# Patient Record
Sex: FEMALE | Race: WHITE | Employment: FULL TIME
[De-identification: names, ages, dates, MRNs, and addresses within clinical notes are randomized per-mention and may not be internally consistent; named-entity substitution may affect disease eponyms.]

---

## 2017-03-01 ENCOUNTER — OFFICE VISIT (OUTPATIENT)
Dept: OBGYN | Facility: CLINIC | Age: 22
End: 2017-03-01

## 2017-03-01 VITALS
SYSTOLIC BLOOD PRESSURE: 126 MMHG | DIASTOLIC BLOOD PRESSURE: 62 MMHG | BODY MASS INDEX: 20.06 KG/M2 | HEIGHT: 62 IN | WEIGHT: 109 LBS

## 2017-03-01 DIAGNOSIS — Z30.46 IMPLANON REMOVAL: Primary | ICD-10-CM

## 2017-03-01 DIAGNOSIS — N94.6 DYSMENORRHEA: ICD-10-CM

## 2017-03-01 PROCEDURE — 11982 REMOVE DRUG IMPLANT DEVICE: CPT | Performed by: OBSTETRICS & GYNECOLOGY

## 2017-03-01 RX ORDER — MEDROXYPROGESTERONE ACETATE 150 MG/ML
150 INJECTION, SUSPENSION INTRAMUSCULAR ONCE
Status: COMPLETED | OUTPATIENT
Start: 2017-03-01 | End: 2017-03-01

## 2017-03-01 RX ADMIN — MEDROXYPROGESTERONE ACETATE 150 MG: 150 INJECTION, SUSPENSION INTRAMUSCULAR at 17:29

## 2017-03-01 ASSESSMENT — PATIENT HEALTH QUESTIONNAIRE - PHQ9
SUM OF ALL RESPONSES TO PHQ9 QUESTIONS 1 & 2: 2
1. LITTLE INTEREST OR PLEASURE IN DOING THINGS: 1
SUM OF ALL RESPONSES TO PHQ QUESTIONS 1-9: 2
2. FEELING DOWN, DEPRESSED OR HOPELESS: 1

## 2017-09-05 ENCOUNTER — OFFICE VISIT (OUTPATIENT)
Dept: INTERNAL MEDICINE CLINIC | Age: 22
End: 2017-09-05
Payer: COMMERCIAL

## 2017-09-05 VITALS
TEMPERATURE: 98.7 F | HEIGHT: 62 IN | DIASTOLIC BLOOD PRESSURE: 78 MMHG | BODY MASS INDEX: 20.28 KG/M2 | SYSTOLIC BLOOD PRESSURE: 110 MMHG | WEIGHT: 110.2 LBS

## 2017-09-05 DIAGNOSIS — F41.9 ANXIETY AND DEPRESSION: ICD-10-CM

## 2017-09-05 DIAGNOSIS — F20.89 OTHER SCHIZOPHRENIA (HCC): ICD-10-CM

## 2017-09-05 DIAGNOSIS — F12.90 MARIJUANA USE, EPISODIC: ICD-10-CM

## 2017-09-05 DIAGNOSIS — Z83.3 FAMILY HISTORY OF DIABETES MELLITUS (DM): ICD-10-CM

## 2017-09-05 DIAGNOSIS — F32.A ANXIETY AND DEPRESSION: ICD-10-CM

## 2017-09-05 DIAGNOSIS — Z71.6 TOBACCO ABUSE COUNSELING: ICD-10-CM

## 2017-09-05 DIAGNOSIS — Z72.0 TOBACCO ABUSE: ICD-10-CM

## 2017-09-05 DIAGNOSIS — R53.83 FATIGUE, UNSPECIFIED TYPE: Primary | ICD-10-CM

## 2017-09-05 PROBLEM — F20.9 SCHIZOPHRENIA (HCC): Status: ACTIVE | Noted: 2017-09-05

## 2017-09-05 PROCEDURE — 99214 OFFICE O/P EST MOD 30 MIN: CPT | Performed by: FAMILY MEDICINE

## 2017-09-05 ASSESSMENT — ENCOUNTER SYMPTOMS
GASTROINTESTINAL NEGATIVE: 1
ALLERGIC/IMMUNOLOGIC NEGATIVE: 1
EYES NEGATIVE: 1
RESPIRATORY NEGATIVE: 1

## 2017-09-07 ENCOUNTER — HOSPITAL ENCOUNTER (OUTPATIENT)
Age: 22
Setting detail: SPECIMEN
Discharge: HOME | End: 2017-09-07
Payer: COMMERCIAL

## 2017-09-07 DIAGNOSIS — F20.89 OTHER SCHIZOPHRENIA (HCC): ICD-10-CM

## 2017-09-07 DIAGNOSIS — R53.83 FATIGUE, UNSPECIFIED TYPE: ICD-10-CM

## 2017-09-07 DIAGNOSIS — F41.9 ANXIETY AND DEPRESSION: ICD-10-CM

## 2017-09-07 DIAGNOSIS — F32.A ANXIETY AND DEPRESSION: ICD-10-CM

## 2017-09-07 LAB
ALBUMIN SERPL-MCNC: 4.3 G/DL (ref 3.5–5.2)
ALBUMIN/GLOBULIN RATIO: 1.5 (ref 1–2.5)
ALP BLD-CCNC: 68 U/L (ref 35–104)
ALT SERPL-CCNC: 26 U/L (ref 5–33)
ANION GAP SERPL CALCULATED.3IONS-SCNC: 15 MMOL/L (ref 9–17)
AST SERPL-CCNC: 21 U/L
BILIRUB SERPL-MCNC: 0.27 MG/DL (ref 0.3–1.2)
BUN BLDV-MCNC: 6 MG/DL (ref 6–20)
BUN/CREAT BLD: ABNORMAL (ref 9–20)
CALCIUM SERPL-MCNC: 9.3 MG/DL (ref 8.6–10.4)
CHLORIDE BLD-SCNC: 103 MMOL/L (ref 98–107)
CHOLESTEROL/HDL RATIO: 3.3
CHOLESTEROL: 150 MG/DL
CO2: 21 MMOL/L (ref 20–31)
CREAT SERPL-MCNC: 0.53 MG/DL (ref 0.5–0.9)
CREATININE URINE: 109.1 MG/DL (ref 28–217)
GFR AFRICAN AMERICAN: >60 ML/MIN
GFR NON-AFRICAN AMERICAN: >60 ML/MIN
GFR SERPL CREATININE-BSD FRML MDRD: ABNORMAL ML/MIN/{1.73_M2}
GFR SERPL CREATININE-BSD FRML MDRD: ABNORMAL ML/MIN/{1.73_M2}
GLUCOSE BLD-MCNC: 78 MG/DL (ref 70–99)
HAV IGM SER IA-ACNC: NONREACTIVE
HCT VFR BLD CALC: 43.1 % (ref 36–46)
HDLC SERPL-MCNC: 46 MG/DL
HEMOGLOBIN: 14.5 G/DL (ref 12–16)
HEPATITIS B CORE IGM ANTIBODY: NONREACTIVE
HEPATITIS B SURFACE ANTIGEN: NONREACTIVE
HEPATITIS C ANTIBODY: NONREACTIVE
HIV AG/AB: NONREACTIVE
LDL CHOLESTEROL: 92 MG/DL (ref 0–130)
MCH RBC QN AUTO: 32.9 PG (ref 26–34)
MCHC RBC AUTO-ENTMCNC: 33.6 G/DL (ref 31–37)
MCV RBC AUTO: 98.1 FL (ref 80–100)
MICROALBUMIN/CREAT 24H UR: <12 MG/L
MICROALBUMIN/CREAT UR-RTO: 11 MCG/MG CREAT
PDW BLD-RTO: 12.9 % (ref 12.5–15.4)
PLATELET # BLD: 296 K/UL (ref 140–450)
PMV BLD AUTO: 9.5 FL (ref 6–12)
POTASSIUM SERPL-SCNC: 3.9 MMOL/L (ref 3.7–5.3)
RBC # BLD: 4.39 M/UL (ref 4–5.2)
SODIUM BLD-SCNC: 139 MMOL/L (ref 135–144)
T. PALLIDUM, IGG: NONREACTIVE
TOTAL PROTEIN: 7.2 G/DL (ref 6.4–8.3)
TRIGL SERPL-MCNC: 59 MG/DL
TSH SERPL DL<=0.05 MIU/L-ACNC: 1.33 MIU/L (ref 0.3–5)
VITAMIN D 25-HYDROXY: 20.8 NG/ML (ref 30–100)
VLDLC SERPL CALC-MCNC: NORMAL MG/DL (ref 1–30)
WBC # BLD: 7.1 K/UL (ref 3.5–11)

## 2017-09-08 LAB
ESTIMATED AVERAGE GLUCOSE: 85 MG/DL
HBA1C MFR BLD: 4.6 % (ref 4–6)

## 2017-09-12 LAB — VDRL, QUANTITATIVE: NEGATIVE

## 2017-10-03 ENCOUNTER — OFFICE VISIT (OUTPATIENT)
Dept: INTERNAL MEDICINE CLINIC | Age: 22
End: 2017-10-03
Payer: COMMERCIAL

## 2017-10-03 VITALS
BODY MASS INDEX: 20.02 KG/M2 | SYSTOLIC BLOOD PRESSURE: 104 MMHG | WEIGHT: 108.8 LBS | HEART RATE: 75 BPM | RESPIRATION RATE: 17 BRPM | HEIGHT: 62 IN | DIASTOLIC BLOOD PRESSURE: 60 MMHG | TEMPERATURE: 98 F | OXYGEN SATURATION: 98 %

## 2017-10-03 DIAGNOSIS — F32.A ANXIETY AND DEPRESSION: ICD-10-CM

## 2017-10-03 DIAGNOSIS — F12.90 MARIJUANA USE, EPISODIC: ICD-10-CM

## 2017-10-03 DIAGNOSIS — F41.9 ANXIETY AND DEPRESSION: ICD-10-CM

## 2017-10-03 DIAGNOSIS — R79.89 LOW VITAMIN D LEVEL: ICD-10-CM

## 2017-10-03 DIAGNOSIS — F20.89 OTHER SCHIZOPHRENIA (HCC): Primary | ICD-10-CM

## 2017-10-03 PROBLEM — Z72.0 TOBACCO ABUSE: Status: RESOLVED | Noted: 2017-09-05 | Resolved: 2017-10-03

## 2017-10-03 PROCEDURE — 99213 OFFICE O/P EST LOW 20 MIN: CPT | Performed by: FAMILY MEDICINE

## 2017-10-03 RX ORDER — FLUOXETINE HYDROCHLORIDE 20 MG/1
40 CAPSULE ORAL DAILY
COMMUNITY

## 2017-10-03 RX ORDER — OMEGA-3S/DHA/EPA/FISH OIL/D3 300MG-1000
400 CAPSULE ORAL DAILY
Qty: 30 TABLET | Refills: 3 | Status: SHIPPED | OUTPATIENT
Start: 2017-10-03

## 2017-10-03 RX ORDER — BUPROPION HYDROCHLORIDE 150 MG/1
150 TABLET ORAL EVERY MORNING
COMMUNITY

## 2017-10-03 ASSESSMENT — ENCOUNTER SYMPTOMS
RESPIRATORY NEGATIVE: 1
EYES NEGATIVE: 1
GASTROINTESTINAL NEGATIVE: 1
ALLERGIC/IMMUNOLOGIC NEGATIVE: 1

## 2017-10-03 NOTE — PROGRESS NOTES
Subjective:      Patient ID: J Luis Coles is a 20 y.o. female. Mental Health Problem   The primary symptoms include dysphoric mood. The current episode started more than 1 month ago. This is a chronic problem. The onset of the illness is precipitated by emotional stress. The degree of incapacity that she is experiencing as a consequence of her illness is moderate. Additional symptoms of the illness include anhedonia and distractible. She does not admit to suicidal ideas. She does not have a plan to commit suicide. She does not contemplate harming herself. She has not already injured self. She does not contemplate injuring another person. She has not already  injured another person. Risk factors that are present for mental illness include a history of mental illness. Review of Systems   Constitutional: Negative. HENT: Negative. Eyes: Negative. Respiratory: Negative. Cardiovascular: Negative. Gastrointestinal: Negative. Endocrine: Negative. Genitourinary: Negative. Musculoskeletal: Negative. Skin: Negative. Allergic/Immunologic: Negative. Neurological: Negative. Hematological: Negative. Psychiatric/Behavioral: Positive for dysphoric mood. History of schizophrenia   Past family and social history unremarkable. Objective:   Physical Exam   Constitutional: She is oriented to person, place, and time. She appears well-developed and well-nourished. HENT:   Head: Normocephalic and atraumatic. Right Ear: External ear normal.   Left Ear: External ear normal.   Nose: Nose normal.   Mouth/Throat: Oropharynx is clear and moist.   Eyes: Conjunctivae and EOM are normal. Pupils are equal, round, and reactive to light. Neck: Normal range of motion. Neck supple. Cardiovascular: Normal rate, regular rhythm, normal heart sounds and intact distal pulses. Pulmonary/Chest: Effort normal and breath sounds normal.   Abdominal: Soft.  Bowel sounds are normal.

## 2017-10-03 NOTE — MR AVS SNAPSHOT
Quantity:  30 tablet   Refills:  3   Started by:  Kelsie Britt MD            Where to Get Your Medications      These medications were sent to [facility] [address] [facility], [address] [phone]  [address] [facility], [address] [place] [id number]     Phone:  [phone]     vitamin D3 400 units Tabs tablet               Your Current Medications Are              buPROPion (WELLBUTRIN XL) 150 MG extended release tablet Take 150 mg by mouth every morning    FLUoxetine (PROZAC) 20 MG capsule Take 40 mg by mouth daily    vitamin D3 (CHOLECALCIFEROL) 400 units TABS tablet Take 1 tablet by mouth daily    fluticasone (VERAMYST) 27.5 MCG/SPRAY nasal spray 2 sprays by Nasal route daily      Allergies              Latex     Haloperidol Shortness Of Breath    Haloperidol Lactate Shortness Of Breath    Sulfa Antibiotics          Additional Information        Basic Information     Date Of Birth Sex Race Ethnicity Preferred Language    1995 Female White Non-/Non  English      Problem List as of 10/3/2017  Date Reviewed: 10/3/2017                Low vitamin D level    Schizophrenia ([facility] [address]    Anxiety and depression    Gestational diabetes mellitus (GDM), delivered    Marijuana use, episodic      Preventive Care        Date Due    Yearly Flu Vaccine (1) 3/16/2018 (Originally 9/1/2017)    Pneumococcal Vaccine - Pneumovax for adults aged 19-64 years with: chronic heart disease, chronic lung disease, diabetes mellitus, alcoholism, chronic liver disease, or cigarette smoking. (1 of 1 - PPSV23) 3/16/2018 (Originally 3/6/2013)    Tetanus Combination Vaccine (1 - Tdap) 4/13/2018 (Originally 3/6/2013)    Pap Smear 11/14/2018 (Originally 3/6/2015)            [address] [place] [address] records indicate that you have an active [employer] account. You can view your After Visit Summary by going to [email or web address] and logging in with your [employer] username and password. If you don't have a [employer] username and password but a parent or guardian has access to your record, the parent or guardian should login with their own [employer] username and password and access your record to view the After Visit Summary. Additional Information  If you have questions, please contact the physician practice where you receive care. Remember, [employer] is NOT to be used for urgent needs. For medical emergencies, dial 911. For questions regarding your [employer] account call [phone]. If you have a clinical question, please call your doctor's office.

## 2017-10-18 ENCOUNTER — HOSPITAL ENCOUNTER (EMERGENCY)
Age: 22
Discharge: HOME | End: 2017-10-18
Attending: EMERGENCY MEDICINE
Payer: COMMERCIAL

## 2017-10-18 VITALS
HEART RATE: 108 BPM | DIASTOLIC BLOOD PRESSURE: 61 MMHG | BODY MASS INDEX: 19.88 KG/M2 | RESPIRATION RATE: 16 BRPM | HEIGHT: 62 IN | WEIGHT: 108 LBS | OXYGEN SATURATION: 98 % | SYSTOLIC BLOOD PRESSURE: 116 MMHG | TEMPERATURE: 98.5 F

## 2017-10-18 DIAGNOSIS — N39.0 URINARY TRACT INFECTION WITHOUT HEMATURIA, SITE UNSPECIFIED: Primary | ICD-10-CM

## 2017-10-18 LAB
-: ABNORMAL
AMORPHOUS: ABNORMAL
BACTERIA: ABNORMAL
BILIRUBIN URINE: NEGATIVE
CASTS UA: ABNORMAL /LPF
CHP ED QC CHECK: YES
COLOR: YELLOW
COMMENT UA: ABNORMAL
CRYSTALS, UA: ABNORMAL /HPF
EPITHELIAL CELLS UA: ABNORMAL /HPF
GLUCOSE URINE: NEGATIVE
KETONES, URINE: NEGATIVE
LEUKOCYTE ESTERASE, URINE: ABNORMAL
MUCUS: ABNORMAL
NITRITE, URINE: POSITIVE
OTHER OBSERVATIONS UA: ABNORMAL
PH UA: 7 (ref 5–8)
PREGNANCY TEST URINE, POC: NORMAL
PROTEIN UA: ABNORMAL
RBC UA: ABNORMAL /HPF (ref 0–2)
RENAL EPITHELIAL, UA: ABNORMAL /HPF
SPECIFIC GRAVITY UA: 1.01 (ref 1–1.03)
TRICHOMONAS: ABNORMAL
TURBIDITY: ABNORMAL
URINE HGB: ABNORMAL
UROBILINOGEN, URINE: NORMAL
WBC UA: ABNORMAL /HPF (ref 0–5)
YEAST: ABNORMAL

## 2017-10-18 PROCEDURE — 87088 URINE BACTERIA CULTURE: CPT

## 2017-10-18 PROCEDURE — 81001 URINALYSIS AUTO W/SCOPE: CPT

## 2017-10-18 PROCEDURE — 99283 EMERGENCY DEPT VISIT LOW MDM: CPT

## 2017-10-18 PROCEDURE — 84703 CHORIONIC GONADOTROPIN ASSAY: CPT

## 2017-10-18 PROCEDURE — 87186 SC STD MICRODIL/AGAR DIL: CPT

## 2017-10-18 PROCEDURE — 87086 URINE CULTURE/COLONY COUNT: CPT

## 2017-10-18 RX ORDER — TRAZODONE HYDROCHLORIDE 100 MG/1
100 TABLET ORAL NIGHTLY
COMMUNITY

## 2017-10-18 RX ORDER — CIPROFLOXACIN 500 MG/1
500 TABLET, FILM COATED ORAL 2 TIMES DAILY
Qty: 20 TABLET | Refills: 0 | Status: SHIPPED | OUTPATIENT
Start: 2017-10-18 | End: 2017-10-28

## 2017-10-18 RX ORDER — PHENAZOPYRIDINE HYDROCHLORIDE 200 MG/1
200 TABLET, FILM COATED ORAL 3 TIMES DAILY PRN
Qty: 6 TABLET | Refills: 0 | Status: SHIPPED | OUTPATIENT
Start: 2017-10-18 | End: 2017-10-21

## 2017-10-18 ASSESSMENT — PAIN DESCRIPTION - ORIENTATION: ORIENTATION: MID

## 2017-10-18 ASSESSMENT — PAIN DESCRIPTION - FREQUENCY: FREQUENCY: CONTINUOUS

## 2017-10-18 ASSESSMENT — PAIN DESCRIPTION - DESCRIPTORS: DESCRIPTORS: ACHING;CONSTANT;PRESSURE

## 2017-10-18 ASSESSMENT — PAIN SCALES - GENERAL: PAINLEVEL_OUTOF10: 7

## 2017-10-18 ASSESSMENT — PAIN DESCRIPTION - LOCATION: LOCATION: BACK

## 2017-10-18 NOTE — ED NOTES
C/o lower back pain and dysuria for 3 days. Denies fever or discharge. color normal skin warm et dry ambulatory to room.      Jason Canela RN  10/18/17 Irasema Morales

## 2017-10-18 NOTE — ED PROVIDER NOTES
The patient was seen and examined by me in conjunction with the mid-level provider. I agree with his/her assessment and treatment plan. My clinical impression is that the patient has a urinary tract infection.      Jese Bonilla MD  10/18/17 [id number]

## 2017-10-18 NOTE — ED PROVIDER NOTES
[address] [place] [address] ED  eMERGENCY dEPARTMENT eNCOUnter      Pt Name: Venessa Haywood  MRN: [id number]  [place] 1995  Date of evaluation: 10/18/2017  Provider: Patricia Velásquez       Chief Complaint   Patient presents with    Back Pain    Dysuria         HISTORY OF PRESENT ILLNESS  (Location/Symptom, Timing/Onset, Context/Setting, Quality, Duration, Modifying Factors, Severity.)   Venessa Haywood is a 20 y.o. female who presents to the emergency department with Back pain and dysuria for 2 days. Dysuria described as stinging started first.  Symptoms are described as not a constant. Pain in the back is worse with movement and relieved with rest.  Denies any vaginal discharge. No definite alleviating or aggravating factors. No other complaints. Nursing Notes were reviewed. ALLERGIES     Latex;  Haloperidol; Haloperidol lactate; and Sulfa antibiotics    CURRENT MEDICATIONS       Discharge Medication List as of 10/18/2017  6:17 PM      CONTINUE these medications which have NOT CHANGED    Details   traZODone (DESYREL) 100 MG tablet Take 100 mg by mouth nightlyHistorical Med      buPROPion (WELLBUTRIN XL) 150 MG extended release tablet Take 150 mg by mouth every morningHistorical Med      FLUoxetine (PROZAC) 20 MG capsule Take 40 mg by mouth dailyHistorical Med      vitamin D3 (CHOLECALCIFEROL) 400 units TABS tablet Take 1 tablet by mouth daily, Disp-30 tablet, R-3Normal      fluticasone (VERAMYST) 27.5 MCG/SPRAY nasal spray 2 sprays by Nasal route dailyHistorical Med             PAST MEDICAL HISTORY         Diagnosis Date    Anxiety     Depression        SURGICAL HISTORY           Procedure Laterality Date    HAND SURGERY Right 2014    ligament repair    HAND SURGERY Right     ligament repair         FAMILY HISTORY           Problem Relation Age of Onset    High Blood Pressure Mother     Diabetes Mother     Heart Disease Sister     Diabetes Sister      Family Status Relation Status    Mother Alive    Father Alive    Sister Alive        SOCIAL HISTORY      reports that she quit smoking about 3 weeks ago. Her smoking use included Cigarettes. She smoked 1.00 pack per day. She has never used smokeless tobacco. She reports that she drinks about 7.2 oz of alcohol per week . She reports that she does not use drugs. REVIEW OF SYSTEMS    (2-9 systems for level 4, 10 or more for level 5)   Review of Systems    Except as noted above the remainder of the review of systems was reviewed and negative. PHYSICAL EXAM    (up to 7 for level 4, 8 or more for level 5)     ED Triage Vitals [10/18/17 1750]   BP Temp Temp Source Pulse Resp SpO2 Height Weight   116/61 98.5 °F (36.9 °C) Oral 108 16 98 % 5' 2\" (1.575 m) 108 lb (49 kg)     Physical Exam   Constitutional: She is oriented to person, place, and time. She appears well-developed. HENT:   Head: Normocephalic and atraumatic. Neck: Normal range of motion. Neck supple. Cardiovascular: Normal rate and regular rhythm. Pulmonary/Chest: Effort normal and breath sounds normal.   Abdominal: Soft. She exhibits no distension. There is no tenderness. Musculoskeletal: Normal range of motion. Neurological: She is alert and oriented to person, place, and time. Skin: Skin is warm. No rash noted. Psychiatric: She has a normal mood and affect.  Her behavior is normal.               DIAGNOSTIC RESULTS     EKG: All EKG's are interpreted by the Emergency Department Physician who either signs or Co-signs this chart in the absence of a cardiologist.        RADIOLOGY:   Non-plain film images such as CT, Ultrasound and MRI are read by the radiologist. Plain radiographic images are visualized and preliminarily interpreted by the emergency physician with the below findings:        Interpretation per the Radiologist below, if available at the time of this note:          ED BEDSIDE ULTRASOUND:   Performed by ED Physician - none    LABS:  Labs Reviewed   UA W/REFLEX CULTURE - Abnormal; Notable for the following:        Result Value    Turbidity UA CLOUDY (*)     Urine Hgb 2+ (*)     Protein, UA 1+ (*)     Nitrite, Urine POSITIVE (*)     Leukocyte Esterase, Urine LARGE (*)     All other components within normal limits   MICROSCOPIC URINALYSIS - Abnormal; Notable for the following:     Bacteria, UA MANY (*)     Amorphous, UA 2+ (*)     All other components within normal limits   POCT URINE PREGNANCY - Normal   URINE CULTURE CLEAN CATCH   POCT URINALYSIS DIPSTICK       All other labs were within normal range or not returned as of this dictation. EMERGENCY DEPARTMENT COURSE and DIFFERENTIAL DIAGNOSIS/MDM:   Vitals:    Vitals:    10/18/17 1750   BP: 116/61   Pulse: 108   Resp: 16   Temp: 98.5 °F (36.9 °C)   TempSrc: Oral   SpO2: 98%   Weight: 108 lb (49 kg)   Height: 5' 2\" (1.575 m)     Patient is nonseptic and nontoxic appearing. Treated for UTI. No flank pain or CVA tenderness to suggest pulmonary arthritis. Doubt kidney stone. CONSULTS:  None    PROCEDURES:  Procedures        FINAL IMPRESSION      1.  Urinary tract infection without hematuria, site unspecified          DISPOSITION/PLAN   DISPOSITION Decision to Discharge    PATIENT REFERRED TO:   Damari Magallanes MD  [address] [address] [phone]    In 3 days        DISCHARGE MEDICATIONS:     Discharge Medication List as of 10/18/2017  6:17 PM      START taking these medications    Details   ciprofloxacin (CIPRO) 500 MG tablet Take 1 tablet by mouth 2 times daily for 10 days, Disp-20 tablet, R-0Print      phenazopyridine (PYRIDIUM) 200 MG tablet Take 1 tablet by mouth 3 times daily as needed for Pain (bladder spasm/pain), Disp-6 tablet, R-0Print                 (Please note that portions of this note were completed with a voice recognition program.  Efforts were made to edit the dictations but occasionally words are mis-transcribed.)    Jessica Culver

## 2017-10-19 LAB — HCG, PREGNANCY URINE (POC): NEGATIVE

## 2017-10-20 LAB
CULTURE: ABNORMAL
CULTURE: ABNORMAL
Lab: ABNORMAL
ORGANISM: ABNORMAL
SPECIMEN DESCRIPTION: ABNORMAL
SPECIMEN DESCRIPTION: ABNORMAL
STATUS: ABNORMAL

## 2018-01-04 ENCOUNTER — OFFICE VISIT (OUTPATIENT)
Dept: INTERNAL MEDICINE CLINIC | Age: 23
End: 2018-01-04
Payer: COMMERCIAL

## 2018-01-04 VITALS
DIASTOLIC BLOOD PRESSURE: 60 MMHG | BODY MASS INDEX: 19.29 KG/M2 | SYSTOLIC BLOOD PRESSURE: 90 MMHG | HEIGHT: 62 IN | OXYGEN SATURATION: 98 % | HEART RATE: 98 BPM | RESPIRATION RATE: 19 BRPM | WEIGHT: 104.8 LBS

## 2018-01-04 DIAGNOSIS — F32.A ANXIETY AND DEPRESSION: ICD-10-CM

## 2018-01-04 DIAGNOSIS — F41.9 ANXIETY AND DEPRESSION: ICD-10-CM

## 2018-01-04 DIAGNOSIS — R79.89 LOW VITAMIN D LEVEL: ICD-10-CM

## 2018-01-04 DIAGNOSIS — F20.9 SCHIZOPHRENIA, UNSPECIFIED TYPE (HCC): ICD-10-CM

## 2018-01-04 DIAGNOSIS — R53.83 FATIGUE, UNSPECIFIED TYPE: Primary | ICD-10-CM

## 2018-01-04 DIAGNOSIS — Z28.21 INFLUENZA VACCINATION DECLINED: ICD-10-CM

## 2018-01-04 PROCEDURE — G8484 FLU IMMUNIZE NO ADMIN: HCPCS | Performed by: FAMILY MEDICINE

## 2018-01-04 PROCEDURE — 1036F TOBACCO NON-USER: CPT | Performed by: FAMILY MEDICINE

## 2018-01-04 PROCEDURE — G8420 CALC BMI NORM PARAMETERS: HCPCS | Performed by: FAMILY MEDICINE

## 2018-01-04 PROCEDURE — 99213 OFFICE O/P EST LOW 20 MIN: CPT | Performed by: FAMILY MEDICINE

## 2018-01-04 PROCEDURE — G8427 DOCREV CUR MEDS BY ELIG CLIN: HCPCS | Performed by: FAMILY MEDICINE

## 2018-01-04 RX ORDER — PROPRANOLOL HYDROCHLORIDE 10 MG/1
10 TABLET ORAL 3 TIMES DAILY
COMMUNITY

## 2018-01-05 ASSESSMENT — ENCOUNTER SYMPTOMS
EYES NEGATIVE: 1
RESPIRATORY NEGATIVE: 1
GASTROINTESTINAL NEGATIVE: 1
ALLERGIC/IMMUNOLOGIC NEGATIVE: 1

## 2018-01-05 NOTE — PROGRESS NOTES
Subjective:      Patient ID: Otilia Sandhoff is a 20 y.o. female. Fatigue   This is a chronic problem. The current episode started more than 1 month ago. The problem occurs intermittently. The problem has been waxing and waning. Associated symptoms include arthralgias and fatigue. The symptoms are aggravated by stress. She has tried rest, sleep and walking for the symptoms. The treatment provided moderate relief. Review of Systems   Constitutional: Positive for fatigue. HENT: Negative. Eyes: Negative. Respiratory: Negative. Cardiovascular: Negative. Gastrointestinal: Negative. Endocrine: Negative. Musculoskeletal: Positive for arthralgias. Skin: Negative. Allergic/Immunologic: Negative. Neurological: Negative. Hematological: Negative. Psychiatric/Behavioral: Positive for dysphoric mood. The patient is nervous/anxious. Past family and social history unremarkable. Objective:   Physical Exam   Constitutional: She is oriented to person, place, and time. She appears well-developed and well-nourished. HENT:   Head: Normocephalic and atraumatic. Right Ear: External ear normal.   Left Ear: External ear normal.   Mouth/Throat: Oropharynx is clear and moist.   Eyes: Conjunctivae and EOM are normal. Pupils are equal, round, and reactive to light. Neck: Normal range of motion. Neck supple. Cardiovascular: Normal rate, regular rhythm, normal heart sounds and intact distal pulses. Pulmonary/Chest: Effort normal and breath sounds normal.   Abdominal: Soft. Bowel sounds are normal.   Genitourinary: Vagina normal and uterus normal.   Musculoskeletal: Normal range of motion. Neurological: She is alert and oriented to person, place, and time. She has normal reflexes. Skin: Skin is warm and dry. Psychiatric:   More disordered-anxiety/depression/schizophrenia. Regular follow-up with mental health services. No recent decompensation   Vitals reviewed.       Assessment:

## 2018-04-12 ENCOUNTER — TELEPHONE (OUTPATIENT)
Dept: INTERNAL MEDICINE CLINIC | Age: 23
End: 2018-04-12

## 2018-07-05 RX ORDER — NITROFURANTOIN 25; 75 MG/1; MG/1
CAPSULE ORAL
Refills: 0 | COMMUNITY
Start: 2018-05-04

## 2019-04-26 ENCOUNTER — OFFICE VISIT (OUTPATIENT)
Dept: INTERNAL MEDICINE CLINIC | Age: 24
End: 2019-04-26
Payer: COMMERCIAL

## 2019-04-26 ENCOUNTER — HOSPITAL ENCOUNTER (OUTPATIENT)
Age: 24
Setting detail: SPECIMEN
Discharge: HOME | End: 2019-04-26
Payer: COMMERCIAL

## 2019-04-26 VITALS
SYSTOLIC BLOOD PRESSURE: 100 MMHG | OXYGEN SATURATION: 98 % | HEIGHT: 62 IN | WEIGHT: 109.2 LBS | BODY MASS INDEX: 20.09 KG/M2 | HEART RATE: 78 BPM | DIASTOLIC BLOOD PRESSURE: 80 MMHG | RESPIRATION RATE: 20 BRPM

## 2019-04-26 DIAGNOSIS — N20.0 RENAL STONE: ICD-10-CM

## 2019-04-26 DIAGNOSIS — R79.89 LOW VITAMIN D LEVEL: ICD-10-CM

## 2019-04-26 DIAGNOSIS — F41.9 ANXIETY AND DEPRESSION: ICD-10-CM

## 2019-04-26 DIAGNOSIS — N39.0 RECURRENT UTI: Primary | ICD-10-CM

## 2019-04-26 DIAGNOSIS — F20.9 SCHIZOPHRENIA, UNSPECIFIED TYPE (HCC): ICD-10-CM

## 2019-04-26 DIAGNOSIS — F12.90 MARIJUANA USE, EPISODIC: ICD-10-CM

## 2019-04-26 DIAGNOSIS — N39.0 RECURRENT UTI: ICD-10-CM

## 2019-04-26 DIAGNOSIS — F32.A ANXIETY AND DEPRESSION: ICD-10-CM

## 2019-04-26 DIAGNOSIS — F17.200 SMOKER: ICD-10-CM

## 2019-04-26 LAB
-: NORMAL
AMORPHOUS: NORMAL
BACTERIA: NORMAL
BILIRUBIN URINE: NEGATIVE
CASTS UA: NORMAL /LPF (ref 0–8)
COLOR: YELLOW
COMMENT UA: ABNORMAL
CRYSTALS, UA: NORMAL /HPF
EPITHELIAL CELLS UA: NORMAL /HPF (ref 0–5)
GLUCOSE URINE: NEGATIVE
KETONES, URINE: NEGATIVE
LEUKOCYTE ESTERASE, URINE: ABNORMAL
MUCUS: NORMAL
NITRITE, URINE: NEGATIVE
OTHER OBSERVATIONS UA: NORMAL
PH UA: 6.5 (ref 5–8)
PROTEIN UA: NEGATIVE
RBC UA: NORMAL /HPF (ref 0–4)
RENAL EPITHELIAL, UA: NORMAL /HPF
SPECIFIC GRAVITY UA: 1.01 (ref 1–1.03)
TRICHOMONAS: NORMAL
TURBIDITY: CLEAR
URINE HGB: NEGATIVE
UROBILINOGEN, URINE: NORMAL
WBC UA: NORMAL /HPF (ref 0–5)
YEAST: NORMAL

## 2019-04-26 PROCEDURE — 1036F TOBACCO NON-USER: CPT | Performed by: FAMILY MEDICINE

## 2019-04-26 PROCEDURE — G8427 DOCREV CUR MEDS BY ELIG CLIN: HCPCS | Performed by: FAMILY MEDICINE

## 2019-04-26 PROCEDURE — 99214 OFFICE O/P EST MOD 30 MIN: CPT | Performed by: FAMILY MEDICINE

## 2019-04-26 PROCEDURE — G8420 CALC BMI NORM PARAMETERS: HCPCS | Performed by: FAMILY MEDICINE

## 2019-04-26 RX ORDER — CEPHALEXIN 250 MG/1
250 CAPSULE ORAL 3 TIMES DAILY
Qty: 30 CAPSULE | Refills: 0 | Status: SHIPPED | OUTPATIENT
Start: 2019-04-26 | End: 2019-05-06

## 2019-04-26 ASSESSMENT — PATIENT HEALTH QUESTIONNAIRE - PHQ9
2. FEELING DOWN, DEPRESSED OR HOPELESS: 0
1. LITTLE INTEREST OR PLEASURE IN DOING THINGS: 0
SUM OF ALL RESPONSES TO PHQ9 QUESTIONS 1 & 2: 0
SUM OF ALL RESPONSES TO PHQ QUESTIONS 1-9: 0
SUM OF ALL RESPONSES TO PHQ QUESTIONS 1-9: 0

## 2019-04-26 ASSESSMENT — ENCOUNTER SYMPTOMS
GASTROINTESTINAL NEGATIVE: 1
BACK PAIN: 1
RESPIRATORY NEGATIVE: 1
ALLERGIC/IMMUNOLOGIC NEGATIVE: 1
EYES NEGATIVE: 1

## 2019-04-26 NOTE — PROGRESS NOTES
Subjective:      Patient ID: Daron Santiago is a 21 y.o. female. Urinary Tract Infection    This is a recurrent problem. The current episode started more than 1 month ago. The problem occurs intermittently. The problem has been waxing and waning. The quality of the pain is described as aching. The pain is mild. There has been no fever. There is a history of pyelonephritis. Associated symptoms include flank pain. She has tried increased fluids for the symptoms. The treatment provided moderate relief. Her past medical history is significant for kidney stones and recurrent UTIs. Review of Systems   Constitutional: Negative. HENT: Negative. Eyes: Negative. Respiratory: Negative. Cardiovascular: Negative. Gastrointestinal: Negative. Endocrine: Negative. Genitourinary: Positive for flank pain. Musculoskeletal: Positive for arthralgias and back pain. Skin: Negative. Allergic/Immunologic: Negative. Neurological: Negative. Hematological: Negative. Psychiatric/Behavioral: Positive for behavioral problems, decreased concentration, dysphoric mood and sleep disturbance. The patient is nervous/anxious. Past family and social history unremarkable. Objective:   Physical Exam   Constitutional: She is oriented to person, place, and time. She appears well-developed and well-nourished. HENT:   Head: Normocephalic and atraumatic. Right Ear: External ear normal.   Left Ear: External ear normal.   Mouth/Throat: Oropharynx is clear and moist.   Eyes: Pupils are equal, round, and reactive to light. Conjunctivae and EOM are normal.   Neck: Normal range of motion. Neck supple. Cardiovascular: Normal rate, regular rhythm, normal heart sounds and intact distal pulses. Pulmonary/Chest: Effort normal and breath sounds normal.   Abdominal: Soft. Bowel sounds are normal.   Genitourinary: Vagina normal and uterus normal.   Musculoskeletal: Normal range of motion.    Neurological: She is alert and oriented to person, place, and time. She has normal reflexes. Skin: Skin is warm and dry. Psychiatric:   History of schizophrenia, anxiety and depression. Improved well. .  She is noncompliant with mental health follow-up   Nursing note and vitals reviewed. Assessment:       Diagnosis Orders   1. Recurrent UTI  UA without Microscopic, Reflex C&S    Darlene Arellano MD, Urology, [place]   2. Renal stone  UA without Microscopic, Reflex C&S    Darlene Arellano MD, Urology, [place]   3. Schizophrenia, unspecified type ([facility] [address]     4. Anxiety and depression     5. Gestational diabetes mellitus (GDM), delivered     6. Marijuana use, episodic     7. Low vitamin D level     8. Smoker             Plan:      50-year-old  female is presented with recurrent UTI. Overall she's been compensated, afebrile hemodynamically stable. Clinical examination is not reproducible. She is advised to maintain oral hydration. She is been placed on Keflex. Patient was seen in the emergency room at [facility] in January 2019. She had a CT scan of the abdomen and pelvis that showed 4 mm stone in the right kidney. In view of history of recurrent UTI within the stool, we will schedule an appointment with urology. Underlying history of schizophrenia, anxiety and depression. Patient stated that she stopped going to mental at services because she is feeling better on Wellbutrin she denies suicidality. History of gestational diabetes. She is euglycemic  Marijuana use  Smoking. Once again she is counseled, quit date, alternatives to consider.   She denies excessive alcohol, cocaine or crack use  She is advised to update tetanus pelvic exam with her GYN  Recommendations to follow labs  She may go to the emergency room as necessary  This note is created with a voice recognition program and while intend to generate a document that accurately reflects the content of the visit, no guarantee can be provided that every mistake has been identified and corrected by editing.                 Anna Ornelas MD

## 2019-04-27 LAB
CULTURE: NORMAL
Lab: NORMAL
SPECIMEN DESCRIPTION: NORMAL

## 2022-09-17 ENCOUNTER — APPOINTMENT (OUTPATIENT)
Dept: CT IMAGING | Age: 27
End: 2022-09-17

## 2022-09-17 ENCOUNTER — HOSPITAL ENCOUNTER (EMERGENCY)
Age: 27
Discharge: HOME | End: 2022-09-17
Attending: STUDENT IN AN ORGANIZED HEALTH CARE EDUCATION/TRAINING PROGRAM

## 2022-09-17 VITALS
SYSTOLIC BLOOD PRESSURE: 104 MMHG | RESPIRATION RATE: 16 BRPM | BODY MASS INDEX: 23.92 KG/M2 | DIASTOLIC BLOOD PRESSURE: 68 MMHG | HEART RATE: 65 BPM | TEMPERATURE: 97.8 F | WEIGHT: 130 LBS | OXYGEN SATURATION: 98 % | HEIGHT: 62 IN

## 2022-09-17 DIAGNOSIS — K62.5 RECTAL BLEEDING: Primary | ICD-10-CM

## 2022-09-17 LAB
ABSOLUTE EOS #: 0 K/UL (ref 0–0.4)
ABSOLUTE LYMPH #: 0.8 K/UL (ref 1–4.8)
ABSOLUTE MONO #: 0.6 K/UL (ref 0.1–1.3)
ALBUMIN SERPL-MCNC: 4.3 G/DL (ref 3.5–5.2)
ALP BLD-CCNC: 69 U/L (ref 35–104)
ALT SERPL-CCNC: 8 U/L (ref 5–33)
ANION GAP SERPL CALCULATED.3IONS-SCNC: 13 MMOL/L (ref 9–17)
AST SERPL-CCNC: 12 U/L
BASOPHILS # BLD: 0 % (ref 0–2)
BASOPHILS ABSOLUTE: 0 K/UL (ref 0–0.2)
BILIRUB SERPL-MCNC: 0.3 MG/DL (ref 0.3–1.2)
BUN BLDV-MCNC: 9 MG/DL (ref 6–20)
CALCIUM SERPL-MCNC: 9 MG/DL (ref 8.6–10.4)
CHLORIDE BLD-SCNC: 104 MMOL/L (ref 98–107)
CO2: 19 MMOL/L (ref 20–31)
CREAT SERPL-MCNC: 0.5 MG/DL (ref 0.5–0.9)
DATE, STOOL #1: ABNORMAL
EOSINOPHILS RELATIVE PERCENT: 0 % (ref 0–4)
GFR AFRICAN AMERICAN: >60 ML/MIN
GFR NON-AFRICAN AMERICAN: >60 ML/MIN
GFR SERPL CREATININE-BSD FRML MDRD: ABNORMAL ML/MIN/{1.73_M2}
GLUCOSE BLD-MCNC: 93 MG/DL (ref 70–99)
HCG QUALITATIVE: NEGATIVE
HCT VFR BLD CALC: 36.6 % (ref 36–46)
HEMOCCULT SP1 STL QL: POSITIVE
HEMOGLOBIN: 12.5 G/DL (ref 12–16)
INR BLD: 1
LYMPHOCYTES # BLD: 7 % (ref 24–44)
MCH RBC QN AUTO: 32.5 PG (ref 26–34)
MCHC RBC AUTO-ENTMCNC: 34 G/DL (ref 31–37)
MCV RBC AUTO: 95.7 FL (ref 80–100)
MONOCYTES # BLD: 5 % (ref 1–7)
PDW BLD-RTO: 12.1 % (ref 11.5–14.9)
PLATELET # BLD: 386 K/UL (ref 150–450)
PMV BLD AUTO: 7.7 FL (ref 6–12)
POTASSIUM SERPL-SCNC: 3.9 MMOL/L (ref 3.7–5.3)
PROTHROMBIN TIME: 13.4 SEC (ref 11.8–14.6)
RBC # BLD: 3.83 M/UL (ref 4–5.2)
SEG NEUTROPHILS: 88 % (ref 36–66)
SEGMENTED NEUTROPHILS ABSOLUTE COUNT: 11.1 K/UL (ref 1.3–9.1)
SODIUM BLD-SCNC: 136 MMOL/L (ref 135–144)
TIME, STOOL #1: 408
TOTAL PROTEIN: 7.6 G/DL (ref 6.4–8.3)
WBC # BLD: 12.5 K/UL (ref 3.5–11)

## 2022-09-17 PROCEDURE — 36415 COLL VENOUS BLD VENIPUNCTURE: CPT

## 2022-09-17 PROCEDURE — 99285 EMERGENCY DEPT VISIT HI MDM: CPT

## 2022-09-17 PROCEDURE — 80053 COMPREHEN METABOLIC PANEL: CPT

## 2022-09-17 PROCEDURE — 85025 COMPLETE CBC W/AUTO DIFF WBC: CPT

## 2022-09-17 PROCEDURE — 2580000003 HC RX 258: Performed by: STUDENT IN AN ORGANIZED HEALTH CARE EDUCATION/TRAINING PROGRAM

## 2022-09-17 PROCEDURE — 84703 CHORIONIC GONADOTROPIN ASSAY: CPT

## 2022-09-17 PROCEDURE — 74177 CT ABD & PELVIS W/CONTRAST: CPT

## 2022-09-17 PROCEDURE — 85610 PROTHROMBIN TIME: CPT

## 2022-09-17 PROCEDURE — 6360000004 HC RX CONTRAST MEDICATION: Performed by: STUDENT IN AN ORGANIZED HEALTH CARE EDUCATION/TRAINING PROGRAM

## 2022-09-17 PROCEDURE — 82270 OCCULT BLOOD FECES: CPT

## 2022-09-17 RX ORDER — SODIUM CHLORIDE 0.9 % (FLUSH) 0.9 %
10 SYRINGE (ML) INJECTION PRN
Status: COMPLETED | OUTPATIENT
Start: 2022-09-17 | End: 2022-09-17

## 2022-09-17 RX ORDER — 0.9 % SODIUM CHLORIDE 0.9 %
100 INTRAVENOUS SOLUTION INTRAVENOUS ONCE
Status: COMPLETED | OUTPATIENT
Start: 2022-09-17 | End: 2022-09-17

## 2022-09-17 RX ADMIN — SODIUM CHLORIDE, PRESERVATIVE FREE 10 ML: 5 INJECTION INTRAVENOUS at 05:30

## 2022-09-17 RX ADMIN — SODIUM CHLORIDE 100 ML: 9 INJECTION, SOLUTION INTRAVENOUS at 05:30

## 2022-09-17 RX ADMIN — IOPAMIDOL 75 ML: 755 INJECTION, SOLUTION INTRAVENOUS at 05:30

## 2022-09-17 ASSESSMENT — ENCOUNTER SYMPTOMS
VOMITING: 0
SORE THROAT: 0
ABDOMINAL PAIN: 0
EYE REDNESS: 0
EYE DISCHARGE: 0
NAUSEA: 0
BLOOD IN STOOL: 1
RHINORRHEA: 0
SHORTNESS OF BREATH: 0
DIARRHEA: 0

## 2022-09-17 ASSESSMENT — PAIN - FUNCTIONAL ASSESSMENT: PAIN_FUNCTIONAL_ASSESSMENT: NONE - DENIES PAIN

## 2022-09-17 NOTE — ED PROVIDER NOTES
EMERGENCY DEPARTMENT ENCOUNTER    Pt Name: Nicolette Gonsalez  MRN: [id number]  [place] 1995  Date of evaluation: 9/17/22  CHIEF COMPLAINT       Chief Complaint   Patient presents with    Rectal Bleeding     For one month, worsening over the last four days. HISTORY OF PRESENT ILLNESS   54-year-old presents with concern for bright red blood per rectum    States its been going on for a while    Blood in her stool    Constant. Feeling a bit lightheaded    No chest pain or pressure. No fevers chills vomiting diarrhea    Went to outside hospital had normal hemoglobin was discharged home she states she feels like she still very concerned and feels like they should have done some sort of imaging            REVIEW OF SYSTEMS     Review of Systems   Constitutional:  Negative for chills and fever. HENT:  Negative for rhinorrhea and sore throat. Eyes:  Negative for discharge and redness. Respiratory:  Negative for shortness of breath. Cardiovascular:  Negative for chest pain. Gastrointestinal:  Positive for blood in stool. Negative for abdominal pain, diarrhea, nausea and vomiting. Genitourinary:  Negative for dysuria, frequency and urgency. Musculoskeletal:  Negative for arthralgias and myalgias. Skin:  Negative for rash. Neurological:  Negative for weakness and numbness. Psychiatric/Behavioral:  Negative for suicidal ideas. All other systems reviewed and are negative. PASTMEDICAL HISTORY     Past Medical History:   Diagnosis Date    Anxiety     Depression      Past Problem List  Patient Active Problem List   Diagnosis Code    [facility] F20.9    Anxiety and depression F41.9, F32. A    Gestational diabetes mellitus (GDM), delivered O22.26    Marijuana use, episodic F12.90    Low vitamin D level R79.89    Renal stone N20.0    Smoker F17.200    Recurrent UTI N39.0     SURGICAL HISTORY       Past Surgical History:   Procedure Laterality Date    HAND SURGERY Right 2014    ligament repair HAND SURGERY Right     ligament repair     CURRENT MEDICATIONS       Previous Medications    BUPROPION (WELLBUTRIN XL) 150 MG EXTENDED RELEASE TABLET    Take 150 mg by mouth every morning    FLUOXETINE (PROZAC) 20 MG CAPSULE    Take 40 mg by mouth daily    NITROFURANTOIN, MACROCRYSTAL-MONOHYDRATE, (MACROBID) 100 MG CAPSULE    take 1 capsule by mouth twice a day    PROPRANOLOL (INDERAL) 10 MG TABLET    Take 10 mg by mouth 3 times daily    TRAZODONE (DESYREL) 100 MG TABLET    Take 100 mg by mouth nightly    VITAMIN D3 (CHOLECALCIFEROL) 400 UNITS TABS TABLET    Take 1 tablet by mouth daily     ALLERGIES     is allergic to latex, haloperidol, haloperidol lactate, and sulfa antibiotics. FAMILY HISTORY     She indicated that her mother is alive. She indicated that her father is alive. She indicated that her sister is alive. SOCIAL HISTORY       Social History     Tobacco Use    Smoking status: Former     Packs/day: 1.00     Types: Cigarettes     Quit date: 2017     Years since quittin.9    Smokeless tobacco: Never   Vaping Use    Vaping Use: Every day   Substance Use Topics    Alcohol use: Yes     Alcohol/week: 12.0 standard drinks     Types: 12 Cans of beer per week     Comment: Rare    Drug use: Yes     Types: Marijuana Estil Catena)     Comment: former      PHYSICAL EXAM     INITIAL VITALS: /68   Pulse 65   Temp 97.8 °F (36.6 °C) (Temporal)   Resp 16   Ht 5' 2\" (1.575 m)   Wt 130 lb (59 kg)   SpO2 98%   BMI 23.78 kg/m²    Physical Exam  Vitals and nursing note reviewed. Constitutional:       Appearance: Normal appearance. HENT:      Head: Normocephalic and atraumatic. Nose: Nose normal.      Mouth/Throat:      Mouth: Mucous membranes are moist.   Eyes:      Conjunctiva/sclera: Conjunctivae normal.      Pupils: Pupils are equal, round, and reactive to light. Cardiovascular:      Rate and Rhythm: Normal rate and regular rhythm. Pulses: Normal pulses.       Heart sounds: Normal heart sounds. Pulmonary:      Effort: Pulmonary effort is normal.      Breath sounds: Normal breath sounds. Abdominal:      Palpations: Abdomen is soft. Tenderness: There is no abdominal tenderness. Genitourinary:     Comments: Chaperoned by female nurse. Some blood at gluteal cleft. No obvious other abnormalities  Musculoskeletal:         General: No swelling or deformity. Cervical back: Normal range of motion. Skin:     General: Skin is warm. Findings: No rash. Neurological:      General: No focal deficit present. Mental Status: She is alert and oriented to person, place, and time.    Psychiatric:         Mood and Affect: Mood normal.       MEDICAL DECISION MAKIN yo F w/ rectal bleeding    Labs and CT  ED Course as of 22 0658   Sat Sep 17, 2022   0438 CBC with Auto Differential(!):    WBC 12.5(!)   RBC 3.83(!)   Hemoglobin Quant 12.5   Hematocrit 36.6   MCV 95.7   MCH 32.5   MCHC 34.0   RDW 12.1   Platelet Count 963   MPV 7.7   Seg Neutrophils 88(!)   Lymphocytes 7(!)   Monocytes 5   Eosinophils % 0   Basophils 0   Segs Absolute 11.10(!)   Absolute Lymph # 0.80(!)   Absolute Mono # 0.60   Absolute Eos # 0.00   Basophils Absolute 0.00  No anemia [SMOOTH]   0502 CMP(!):    Glucose, Random 93   BUN,BUNPL 9   Creatinine 0.50   CALCIUM, SERUM, 907756 9.0   Sodium 136   Potassium 3.9   Chloride 104   CO2 19(!)   Anion Gap 13   Alk Phos 69   ALT 8   AST 12   Bilirubin 0.3   Total Protein 7.6   Albumin 4.3   GFR Non- >60   GFR  >60   GFR Comment       Normal [SMOOTH]   0503 Protime-INR:    Prothrombin Time 13.4   INR 1.0  Normal [SMOOTH]   0503 HCG Qualitative, Serum:    hCG Qual NEGATIVE  Negative [SMOOTH]   0503 Occult Blood Screen(!):    Occult Blood, Stool #1 POSITIVE(!)   Date, Stool #1 2,425,992   Time, Stool #1 408  Positive [SMOOTH]   0653 CT ABDOMEN PELVIS W IV CONTRAST Additional Contrast? None  Unremarkable [SMOOTH]      ED Course User Index  [SMOOTH] Lima Obando MD       CRITICAL CARE:       PROCEDURES:    Procedures    DIAGNOSTIC RESULTS   EKG:All EKG's are interpreted by the Emergency Department Physician who either signs or Co-signs this chart in the absence of a cardiologist.        RADIOLOGY:All plain film, CT, MRI, and formal ultrasound images (except ED bedside ultrasound) are read by the radiologist, see reports below, unless otherwisenoted in MDM or here. CT ABDOMEN PELVIS W IV CONTRAST Additional Contrast? None   Final Result   Mild stool volume in the colon, without ileus, obstruction, or inflammatory   bowel wall thickening to suggest enteritis or colitis. Nonobstructive bilateral renal calculi           LABS: All lab results were reviewed by myself, and all abnormals are listed below.   Labs Reviewed   CBC WITH AUTO DIFFERENTIAL - Abnormal; Notable for the following components:       Result Value    WBC 12.5 (*)     RBC 3.83 (*)     Seg Neutrophils 88 (*)     Lymphocytes 7 (*)     Segs Absolute 11.10 (*)     Absolute Lymph # 0.80 (*)     All other components within normal limits   COMPREHENSIVE METABOLIC PANEL - Abnormal; Notable for the following components:    CO2 19 (*)     All other components within normal limits   OCCULT BLOOD SCREEN - Abnormal; Notable for the following components:    Occult Blood, Stool #1 POSITIVE (*)     All other components within normal limits   PROTIME-INR   HCG, SERUM, QUALITATIVE       EMERGENCY DEPARTMENTCOURSE:     This is a 25-year-old woman that presented with some bright red blood per rectum    Here has completely normal hemodynamics    She is relatively young and healthy    Unclear etiology but imaging is unremarkable she has a normal hemoglobin and she is hemodynamically stable    I feel she can have this worked up as an outpatient    I will refer her for GI    She will be given strict return precautions for heavy bleeding or symptoms of anemia    She expressed understanding and agreeable to this plan    Vitals: Vitals:    09/17/22 0238 09/17/22 0632   BP: 121/74 104/68   Pulse: (!) 106 65   Resp: 16 16   Temp: 97.8 °F (36.6 °C)    TempSrc: Temporal    SpO2: 99% 98%   Weight: 130 lb (59 kg)    Height: 5' 2\" (1.575 m)        The patient was given the following medications while in the emergency department:  Orders Placed This Encounter   Medications    0.9 % sodium chloride bolus    iopamidol (ISOVUE-370) 76 % injection 75 mL    sodium chloride flush 0.9 % injection 10 mL     CONSULTS:  None    FINAL IMPRESSION      1. Rectal bleeding          DISPOSITION/PLAN   DISPOSITION Decision To Discharge 09/17/2022 06:55:13 AM      PATIENT REFERRED TO:  [address] [place] [address], MD  [address] [place] [address]  [phone]    Schedule an appointment as soon as possible for a visit       [facility] 469 [phone]    As needed  DISCHARGE MEDICATIONS:  New Prescriptions    No medications on file     The care is provided during an unprecedented national emergency due to the novel coronavirus, COVID 19.   MD Shiraz Burgos MD  09/17/22 0751

## 2022-09-17 NOTE — ED NOTES
Mode of arrival (squad #, walk in, police, etc) : walk in        Chief complaint(s): rectal bleeding        Arrival Note (brief scenario, treatment PTA, etc). : Pt states she has been having bloody stools for one month, increasing over the last few days. Pt was seen at [facility] om 9/16 and was told her hemoglobin was stable and to follow up with GI. Pt would like to know why she is bleeding and feeling like she is dying. C= \"Have you ever felt that you should Cut down on your drinking? \"  No  A= \"Have people Annoyed you by criticizing your drinking? \"  No  G= \"Have you ever felt bad or Guilty about your drinking? \"  No  E= \"Have you ever had a drink as an Eye-opener first thing in the morning to steady your nerves or to help a hangover? \"  No      Deferred []      Reason for deferring: N/A    *If yes to two or more: probable alcohol abuse. Mariano Boyd, ALL  09/17/22 9639

## 2024-03-27 NOTE — ED NOTES
Jolie Morel is a 82 Present in room with Dr. Lopez Kiser during rectal exam.  Patient tolerated without complaint.      Mishel Frederick RN  09/17/22 3024

## 2025-01-20 ENCOUNTER — HOSPITAL ENCOUNTER (INPATIENT)
Age: 30
LOS: 7 days | Discharge: HOME | DRG: 885 | End: 2025-01-27
Attending: PSYCHIATRY & NEUROLOGY | Admitting: PSYCHIATRY & NEUROLOGY
Payer: MEDICAID

## 2025-01-20 PROBLEM — F32.A DEPRESSION WITH SUICIDAL IDEATION: Status: ACTIVE | Noted: 2025-01-20

## 2025-01-20 PROBLEM — R45.851 DEPRESSION WITH SUICIDAL IDEATION: Status: ACTIVE | Noted: 2025-01-20

## 2025-01-20 PROCEDURE — 1240000000 HC EMOTIONAL WELLNESS R&B

## 2025-01-20 RX ORDER — POLYETHYLENE GLYCOL 3350 17 G/17G
17 POWDER, FOR SOLUTION ORAL DAILY PRN
Status: DISCONTINUED | OUTPATIENT
Start: 2025-01-20 | End: 2025-01-27 | Stop reason: HOSPADM

## 2025-01-20 RX ORDER — ACETAMINOPHEN 325 MG/1
650 TABLET ORAL EVERY 4 HOURS PRN
Status: DISCONTINUED | OUTPATIENT
Start: 2025-01-20 | End: 2025-01-27 | Stop reason: HOSPADM

## 2025-01-20 RX ORDER — HYDROXYZINE HYDROCHLORIDE 50 MG/1
50 TABLET, FILM COATED ORAL 3 TIMES DAILY PRN
Status: DISCONTINUED | OUTPATIENT
Start: 2025-01-20 | End: 2025-01-27 | Stop reason: HOSPADM

## 2025-01-20 RX ORDER — POLYETHYLENE GLYCOL 3350 17 G
2 POWDER IN PACKET (EA) ORAL
Status: DISCONTINUED | OUTPATIENT
Start: 2025-01-20 | End: 2025-01-24

## 2025-01-20 RX ORDER — NITROFURANTOIN 25; 75 MG/1; MG/1
100 CAPSULE ORAL EVERY 12 HOURS SCHEDULED
Status: COMPLETED | OUTPATIENT
Start: 2025-01-21 | End: 2025-01-25

## 2025-01-20 RX ORDER — MAGNESIUM HYDROXIDE/ALUMINUM HYDROXICE/SIMETHICONE 120; 1200; 1200 MG/30ML; MG/30ML; MG/30ML
30 SUSPENSION ORAL EVERY 6 HOURS PRN
Status: DISCONTINUED | OUTPATIENT
Start: 2025-01-20 | End: 2025-01-27 | Stop reason: HOSPADM

## 2025-01-20 RX ORDER — IBUPROFEN 400 MG/1
400 TABLET, FILM COATED ORAL EVERY 6 HOURS PRN
Status: DISCONTINUED | OUTPATIENT
Start: 2025-01-20 | End: 2025-01-27 | Stop reason: HOSPADM

## 2025-01-20 ASSESSMENT — PATIENT HEALTH QUESTIONNAIRE - PHQ9
SUM OF ALL RESPONSES TO PHQ QUESTIONS 1-9: 2
SUM OF ALL RESPONSES TO PHQ QUESTIONS 1-9: 2
SUM OF ALL RESPONSES TO PHQ9 QUESTIONS 1 & 2: 2
SUM OF ALL RESPONSES TO PHQ QUESTIONS 1-9: 2
2. FEELING DOWN, DEPRESSED OR HOPELESS: SEVERAL DAYS
1. LITTLE INTEREST OR PLEASURE IN DOING THINGS: SEVERAL DAYS
SUM OF ALL RESPONSES TO PHQ QUESTIONS 1-9: 2

## 2025-01-20 ASSESSMENT — SLEEP AND FATIGUE QUESTIONNAIRES
DO YOU USE A SLEEP AID: NO
SLEEP PATTERN: DIFFICULTY FALLING ASLEEP;DISTURBED/INTERRUPTED SLEEP;RESTLESSNESS
AVERAGE NUMBER OF SLEEP HOURS: 4
DO YOU HAVE DIFFICULTY SLEEPING: YES

## 2025-01-20 ASSESSMENT — LIFESTYLE VARIABLES
HOW MANY STANDARD DRINKS CONTAINING ALCOHOL DO YOU HAVE ON A TYPICAL DAY: PATIENT DOES NOT DRINK
HOW OFTEN DO YOU HAVE A DRINK CONTAINING ALCOHOL: NEVER

## 2025-01-21 PROBLEM — F23 ACUTE PSYCHOSIS (HCC): Status: ACTIVE | Noted: 2025-01-21

## 2025-01-21 PROCEDURE — 6370000000 HC RX 637 (ALT 250 FOR IP): Performed by: NURSE PRACTITIONER

## 2025-01-21 PROCEDURE — 1240000000 HC EMOTIONAL WELLNESS R&B

## 2025-01-21 PROCEDURE — 99222 1ST HOSP IP/OBS MODERATE 55: CPT | Performed by: INTERNAL MEDICINE

## 2025-01-21 PROCEDURE — 90792 PSYCH DIAG EVAL W/MED SRVCS: CPT | Performed by: PSYCHIATRY & NEUROLOGY

## 2025-01-21 PROCEDURE — 6370000000 HC RX 637 (ALT 250 FOR IP): Performed by: PSYCHIATRY & NEUROLOGY

## 2025-01-21 RX ORDER — CHLORPROMAZINE HYDROCHLORIDE 25 MG/1
50 TABLET, FILM COATED ORAL 3 TIMES DAILY PRN
Status: DISCONTINUED | OUTPATIENT
Start: 2025-01-21 | End: 2025-01-27 | Stop reason: HOSPADM

## 2025-01-21 RX ORDER — CHLORPROMAZINE HCI 25 MG/ML
50 INJECTION INTRAMUSCULAR 3 TIMES DAILY PRN
Status: DISCONTINUED | OUTPATIENT
Start: 2025-01-21 | End: 2025-01-27 | Stop reason: HOSPADM

## 2025-01-21 RX ORDER — OLANZAPINE 5 MG/1
5 TABLET ORAL NIGHTLY
Status: DISCONTINUED | OUTPATIENT
Start: 2025-01-21 | End: 2025-01-23

## 2025-01-21 RX ORDER — TESTOSTERONE CYPIONATE 200 MG/ML
50 INJECTION, SOLUTION INTRAMUSCULAR
Status: ON HOLD | COMMUNITY
Start: 2024-11-11 | End: 2025-01-27 | Stop reason: HOSPADM

## 2025-01-21 RX ADMIN — OLANZAPINE 5 MG: 5 TABLET, FILM COATED ORAL at 20:35

## 2025-01-21 RX ADMIN — Medication 3 MG: at 00:14

## 2025-01-21 RX ADMIN — Medication 3 MG: at 20:35

## 2025-01-21 RX ADMIN — NITROFURANTOIN MONOHYDRATE/MACROCRYSTALS 100 MG: 25; 75 CAPSULE ORAL at 09:19

## 2025-01-21 RX ADMIN — NICOTINE POLACRILEX 2 MG: 2 LOZENGE ORAL at 20:39

## 2025-01-21 RX ADMIN — NICOTINE POLACRILEX 2 MG: 2 LOZENGE ORAL at 18:39

## 2025-01-21 RX ADMIN — HYDROXYZINE HYDROCHLORIDE 50 MG: 50 TABLET ORAL at 20:34

## 2025-01-21 RX ADMIN — HYDROXYZINE HYDROCHLORIDE 50 MG: 50 TABLET ORAL at 00:14

## 2025-01-21 RX ADMIN — NITROFURANTOIN MONOHYDRATE/MACROCRYSTALS 100 MG: 25; 75 CAPSULE ORAL at 20:35

## 2025-01-21 ASSESSMENT — LIFESTYLE VARIABLES
HOW OFTEN DO YOU HAVE A DRINK CONTAINING ALCOHOL: NEVER
HOW MANY STANDARD DRINKS CONTAINING ALCOHOL DO YOU HAVE ON A TYPICAL DAY: PATIENT DOES NOT DRINK

## 2025-01-21 NOTE — BH NOTE
Patient given tobacco quitline number 77954635726 ,patient given information as to the dangers of long term tobacco use. Continue to reinforce the importance of tobacco cessation.

## 2025-01-21 NOTE — H&P
[facility] Internal Medicine  Jay Alicea MD; Reagan Contreras MD, Daniel Jiang MD, Kesha Fernandez MD, Vitaliy Sanchez MD; Samantha Perales MD    [facility] Internal Medicine   IN-PATIENT SERVICE   [facility]     HISTORY AND PHYSICAL EXAMINATION            Date:   1/21/2025  Patient name:  Tasneem Martínez  Date of admission:  1/20/2025 11:29 PM  MRN:   [id number]  Account:  [id number]  YOB: 1995  PCP:    No primary care provider on file.  Room:   [address] [place] [address]  Code Status:    Full Code      Chief Complaint:     Suicidal /Ac Psychosis    History Obtained From:     Patient/EMR/bedside RN     History of Present Illness:   29-year-old female with underlying history of anxiety, depression, UTI admitted to inpatient psych with suicidal ideations      Past Medical History:     Past Medical History:   Diagnosis Date    Anxiety     Depression         Past Surgical History:     Past Surgical History:   Procedure Laterality Date    HAND SURGERY Right 2014    ligament repair    HAND SURGERY Right     ligament repair        Medications Prior to Admission:     Prior to Admission medications    Medication Sig Start Date End Date Taking? Authorizing Provider   testosterone cypionate (DEPOTESTOTERONE CYPIONATE) 200 MG/ML injection Inject 0.25 mLs into the skin every 7 days. 11/11/24 2/9/25  Taz Clayton MD   nitrofurantoin, macrocrystal-monohydrate, (MACROBID) 100 MG capsule Take 1 capsule by mouth 2 times daily 1/20/25 1/25/25  Taz Clayton MD        Allergies:     Latex, Haloperidol, Haloperidol lactate, and Sulfa antibiotics    Social History:     Tobacco:    reports that she quit smoking about 7 years ago. Her smoking use included cigarettes. She has never used smokeless tobacco.  Alcohol:      reports current alcohol use of about 12.0 standard drinks of alcohol per week.  Drug Use:  reports current drug use. Drug: Marijuana

## 2025-01-21 NOTE — CARE COORDINATION
[facility] Biopsychosocial Assessment    Current Level of Psychosocial Functioning     Independent   Dependent    Minimal Assist X    Psychosocial High Risk Factors (check all that apply)    Unable to obtain meds   Chronic illness/pain    Substance abuse   Lack of Family Support X  Financial stress X  Isolation X  Inadequate Community Resources  Suicide attempt(s)  Not taking medications X  Victim of crime   Developmental Delay  Unable to manage personal needs  X  Age 65 or older   Homeless  No transportation   Readmission within 30 days  Unemployment  Traumatic Event    Psychiatric Advanced Directives: none reported     Family to Involve in Treatment: lack of family support     Sexual Orientation: PEEWEE     Patient Strengths: adequate housing, motivated for treatment     Patient Barriers: not linked with [facility], not being prescribed any psychiatric medications, reporting auditory hallucinations, presenting on admission with suicidal ideation.     Opiate Education Provided: N/A Patient denies and does not have any documented history of Opiate or Heroin use/abuse. Patient denies any Alcohol or Illicit drug use and drug screen upon admission was negative for all substances.     [facility]/mental health history: Not linked, last linked with [facility], but wants to go somewhere closer to where she is living, lives in an apartment in [place]     Plan of Care   medication management, group/individual therapies, family meetings, psycho -education, treatment team meetings to assist with stabilization    Initial Discharge Plan:  Patient reports a plan to return to apartment in [place] and plans on following up with outpatient treatment at either [facility] or [facility] upon discharge.     Clinical Summary: Patient is a 29 year old female who prefers to be called [facility] and is currently undergoing testosterone replacement therapy. Patient presents on admission with suicidial thoughts to overdose on medications. Patient reports increased auditory

## 2025-01-21 NOTE — GROUP NOTE
Group Therapy Note    Date: 1/21/2025    Group Start Time: 1100  Group End Time: 1150  Group Topic: Cognitive Skills    Kristen Snider CTRS        Group Therapy Note    Attendees: 9/16     Topic: To increase socialization, practice problem solving, deductive reasoning and  communication skills.            Comments:   Patient did not participate in Cognitive Skills Group, at 1100, despite staff encouragement.   Pt was resting in room and seclusive to self when invited to group.      Q15 minute safety checks maintained for patient safety and will continue to encourage   patient to attend unit programming.       Discipline Responsible: Psychoeducational Specialist   Signature: MIRZA ROBERTS

## 2025-01-21 NOTE — GROUP NOTE
Group Therapy Note    Date: 1/21/2025    Group Start Time: 1000  Group End Time: 1030  Group Topic: Psychoeducation    Mohsen Ford        Group Therapy Note    Attendees: 6/16     Patient was offered group therapy today but declined to participate despite encouragement from staff. 1:1 was offered.    Signature:  Mohsen Gaming

## 2025-01-21 NOTE — BH NOTE
[facility]  Initial Interdisciplinary Treatment Plan Note      Original treatment plan Date & Time: 1/21//25 1200    Admission Type:  Admission Type: Involuntary    Reason for admission:   Reason for Admission: Patient presents to the ED with suicidial thoughts to overdose on medications. Patient reports increased auditory hallucinations of multiple different voices having conversations. He also reports increased \"PTSD attacks\" cause inability to sleep    Estimated Length of Stay:  5-7days  Estimated Discharge Date: To be determined by physician.    PATIENT STRENGTHS:  Patient Strengths:   Patient Strengths and Limitations:   Addictive Behavior: Addictive Behavior  In the Past 3 Months, Have You Felt or Has Someone Told You That You Have a Problem With  : None  Medical Problems:  Past Medical History:   Diagnosis Date    Anxiety     Depression      Status EXAM:Mental Status and Behavioral Exam  Normal: No  Level of Assistance: Independent/Self  Facial Expression: Avoids Gaze  Affect: Blunt  Level of Consciousness: Alert  Frequency of Checks: 4 times per hour, close  Mood:Normal: No  Mood: Depressed, Anxious, Irritable, Sad  Motor Activity:Normal: Yes  Eye Contact: Poor  Observed Behavior: Withdrawn, Preoccupied, Guarded  Sexual Misconduct History: Current - no  Preception: [place] to person, [place] to time, [place] to place, [place] to situation  Attention:Normal: No  Attention: Unable to concentrate, Distractible  Thought Processes: Unremarkable  Thought Content:Normal: No  Thought Content: Preoccupations  Depression Symptoms: Feelings of helplessness, Feelings of hopelessess, Feelings of worthlessness, Impaired concentration, Loss of interest, Isolative, Sleep disturbance  Anxiety Symptoms: Panic attack, Generalized, Unexplained fears  Estee Symptoms: No problems reported or observed.  Hallucinations: Auditory (comment)  Delusions: No  Memory:Normal: Yes  Insight and Judgment: No  Insight and

## 2025-01-21 NOTE — BH NOTE
[facility]  Admission Note     Admission Type:   Involuntary - Signed in Upon Admission    Reason for admission:  Reason for Admission: Patient presents to the ED with suicidial thoughts to overdose on medications. Patient reports increased auditory hallucinations of multiple different voices having conversations. He also reports increased \"PTSD attacks\" cause inability to sleep      Addictive Behavior:   Addictive Behavior  In the Past 3 Months, Have You Felt or Has Someone Told You That You Have a Problem With  : None    Medical Problems:   Past Medical History:   Diagnosis Date    Anxiety     Depression        Status EXAM:  Mental Status and Behavioral Exam  Normal: No  Level of Assistance: Independent/Self  Facial Expression: Avoids Gaze  Affect: Blunt  Level of Consciousness: Alert  Frequency of Checks: 4 times per hour, close  Mood:Normal: No  Mood: Depressed, Anxious, Irritable, Sad  Motor Activity:Normal: Yes  Eye Contact: Poor  Observed Behavior: Withdrawn, Preoccupied, Guarded  Sexual Misconduct History: Current - no  Preception: [place] to person, [place] to time, [place] to place, [place] to situation  Attention:Normal: No  Attention: Unable to concentrate, Distractible  Thought Processes: Unremarkable  Thought Content:Normal: No  Thought Content: Preoccupations  Depression Symptoms: Feelings of helplessness, Feelings of hopelessess, Feelings of worthlessness, Impaired concentration, Loss of interest, Isolative, Sleep disturbance  Anxiety Symptoms: Panic attack, Generalized, Unexplained fears  Estee Symptoms: No problems reported or observed.  Hallucinations: Auditory (comment)  Delusions: No  Memory:Normal: Yes  Insight and Judgment: No  Insight and Judgment: Poor judgment, Poor insight    Tobacco Screening:  Practical Counseling, on admission, kevin X, if applicable and completed (first 3 are required if patient doesn't refuse):            ( ) Recognizing danger situations (included

## 2025-01-21 NOTE — H&P
Department of Psychiatry  Attending Physician Psychiatric Assessment   Patient: Tasneem Martínez  MRN: [id number]  Reason for Admission to Psychiatric Unit:  Threat to self requiring 24 hour professional observation  Acute disordered/bizarre behavior or psychomotor agitation or retardation;interferes with ADLs so that patient cannot function at a less intensive care level of care during evaluation and treatment   Concerns about patient's safety in the community  Past Mental Health Diagnosis: a history of  Major Depression, Schizophrenia, and Anxiety Disorder  Triggering event or precipitating factor: Relationship Issues  Length of time/duration of triggering event: Months  Legal Status: Voluntary    CHIEF COMPLAINT:  Auditory Hallucinations with suicidal ideations    History obtained from: Patient, electronic medical record          HISTORY OF PRESENT ILLNESS:    Tasneem Martínez is a 29 y.o. female who prefers to go by Abhinav and he/him pronouns and is currently undergoing testosterone replacement therapy for gender dysphoria.  Has history of ovarian cysts, recurrent UTI and kidney stone.   Patient has a psychiatric history of schizophrenia depression and anxiety.      Patient presented to ER with concerns for auditory hallucinations with commands to harm herself.  Suicidal and homicidal ideations.  Patient was placed in [facility] for management of current condition and for patient safety.    Patient states he continuously has these voices who continuously repeat \"terrible\" things in his head and give him commands to do terrible things to himself and to others around him.  States he has had these voices in his head quite long time.  Is currently not on any antipsychotics was previously on antipsychotics however is unsure of names of medication.  Patient confirms he currently has suicidal ideations with a plan.  Over past 2 weeks the voices have grown louder and become difficult to ignore.  Patient has tried taking multiple

## 2025-01-22 PROCEDURE — 99231 SBSQ HOSP IP/OBS SF/LOW 25: CPT | Performed by: INTERNAL MEDICINE

## 2025-01-22 PROCEDURE — 6370000000 HC RX 637 (ALT 250 FOR IP): Performed by: NURSE PRACTITIONER

## 2025-01-22 PROCEDURE — 1240000000 HC EMOTIONAL WELLNESS R&B

## 2025-01-22 PROCEDURE — 99232 SBSQ HOSP IP/OBS MODERATE 35: CPT | Performed by: PSYCHIATRY & NEUROLOGY

## 2025-01-22 PROCEDURE — 6370000000 HC RX 637 (ALT 250 FOR IP): Performed by: PSYCHIATRY & NEUROLOGY

## 2025-01-22 RX ADMIN — NITROFURANTOIN MONOHYDRATE/MACROCRYSTALS 100 MG: 25; 75 CAPSULE ORAL at 09:54

## 2025-01-22 RX ADMIN — OLANZAPINE 5 MG: 5 TABLET, FILM COATED ORAL at 21:22

## 2025-01-22 RX ADMIN — NICOTINE POLACRILEX 2 MG: 2 LOZENGE ORAL at 18:46

## 2025-01-22 RX ADMIN — NICOTINE POLACRILEX 2 MG: 2 LOZENGE ORAL at 08:43

## 2025-01-22 RX ADMIN — NICOTINE POLACRILEX 2 MG: 2 LOZENGE ORAL at 09:54

## 2025-01-22 RX ADMIN — HYDROXYZINE HYDROCHLORIDE 50 MG: 50 TABLET ORAL at 21:22

## 2025-01-22 RX ADMIN — HYDROXYZINE HYDROCHLORIDE 50 MG: 50 TABLET ORAL at 11:02

## 2025-01-22 RX ADMIN — NITROFURANTOIN MONOHYDRATE/MACROCRYSTALS 100 MG: 25; 75 CAPSULE ORAL at 21:22

## 2025-01-22 RX ADMIN — Medication 3 MG: at 21:22

## 2025-01-22 RX ADMIN — NICOTINE POLACRILEX 2 MG: 2 LOZENGE ORAL at 14:37

## 2025-01-22 NOTE — GROUP NOTE
Group Therapy Note    Date: 1/22/2025    Group Start Time: 0900  Group End Time: 0955  Group Topic: Community Meeting    [facility] Kiara Lim LPN        Group Therapy Note    Attendees: 13/20     patient refused to attend Goals group at 0900 after encouragement from staff.  1:1 talk time provided as alternative to group session        Signature:  Kiara Villela LPN

## 2025-01-22 NOTE — GROUP NOTE
Group Therapy Note    Date: 1/22/2025    Group Start Time: 1315  Group End Time: 1425  Group Topic: Cognitive Skills    [facility]    Kristen Bowers CTRS        Group Therapy Note    Attendees: 10/19     Patient's Goal: To increase socialization, practice self expression, explore positive people/places   /things related to stress management using the senses, through creative expression   and discussion.       Notes: Pt was pleasant and cooperative and participated in group fully. Pt was able to practice self   expression, explore positive people/places /things related to stress management using the senses,   through creative expression and discussion.     Pt shared briefly individually, and was attentive to discussion .      Status After Intervention:  Improved     Participation Level: Active Listener,  sharing , supportive     Participation Quality: Appropriate,  Attentive, sharing , supportive     Speech: Normal     Thought Process/Content: Logical , linear.      Affective Functioning: Congruent, brightened     Mood: Euthymic , pt took pride in her art work and asked to complete it after group.RT provided materials when groups were not in session.      Level of consciousness:  Alert, and Attentive      Response to Learning:  Able to verbalize current knowledge ,able to   verbalize/acknowledge new learning, able to verbalize some insight, and Progressing to goal      Endings: None Reported     Modes of Intervention: Education, Support, Socialization, Exploration, Clarifying and Problem-solving      Discipline Responsible: Psychoeducational Specialist      Signature:  MIRZA ROBERTS

## 2025-01-22 NOTE — GROUP NOTE
Group Therapy Note    Date: 1/22/2025    Group Start Time: 1100  Group End Time: 1205  Group Topic: Cognitive Skills    [facility] Kristen Price CTRS        Group Therapy Note    Attendees: 11/20     Patient's Goal: To increase socialization, practice problem solving, collaborating with peers, and  communication skills.           Notes: Pt was pleasant and cooperative and participated in group. Pt was able to practice problem solving, collaborating with peers, and  communication skills independently.          Status After Intervention:  Improved     Participation Level:  Attentive, sharing , supportive     Participation Quality: Attentive, appropriate, sharing , supportive        Speech:  Normal        Thought Process/Content: Logical, linear        Affective Functioning: Restricted        Mood: Restricted but polite and cooperative with peers on his team. Nursing staff informed RT that pt had c/o being angry and uncomfortable around a peer in group -staff reminded pt that they could bring concerns to RT's attention/ step out of group if necessary.    RT did meet with pt prior to next group to explore pt's concerns and problem solve so that pt could feel comfortable in group. See next group note.        Level of consciousness:  Attentive and Alert        Response to Learning: Able to verbalize current knowledge/experience, Able to verbalize/acknowledge new learning, and Progressing to goal        Endings: None Reported     Modes of Intervention: Education, Support, Exploration, Clarifying, and Problem solving        Discipline Responsible: Psychoeducational Specialist        Signature:  MIRZA ROBERTS

## 2025-01-22 NOTE — GROUP NOTE
Group Therapy Note    Date: 1/21/2025    Group Start Time: 2000  Group End Time: 2030  Group Topic: Focus Group    Deonte Denney LPN        Group Therapy Note    Attendees: 9/18    Patient attended group, and was polite and participative to the conversation.      Discussion was centered around small goals for self improvement every day, and how they can add up to big changes in our lives, and shape who we want to be.         Participation Level: Active Listener    Participation Quality: Appropriate      Speech:  normal          Signature:  DEONTE ROBERTS LPN

## 2025-01-22 NOTE — PLAN OF CARE
Problem: Self Harm/Suicidality  Goal: Will have no self-injury during hospital stay  Description: INTERVENTIONS:  1.  Ensure constant observer at bedside with Q15M safety checks  2.  Maintain a safe environment  3.  Secure patient belongings  4.  Ensure family/visitors adhere to safety recommendations  5.  Ensure safety tray has been added to patient's diet order  6.  Every shift and PRN: Re-assess suicidal risk via Frequent Screener    Note: Patient denies suicidal ideation and thoughts of harm to self and others.      Problem: Depression  Goal: Will be euthymic at discharge  Description: INTERVENTIONS:  1. Administer medication as ordered  2. Provide emotional support via 1:1 interaction with staff  3. Encourage involvement in milieu/groups/activities  4. Monitor for social isolation  Note: Patient reports feeling depressed. Reports hasn't slept in 2 nights and is having difficulty thinking. Patient reports auditory hallucinations that never stop and are distracting.      Problem: Risk for Elopement  Goal: Patient will not exit the unit/facility without proper excort  Note: Patient has not demonstrated elopement risk behaviors on this shift.

## 2025-01-22 NOTE — GROUP NOTE
Group Therapy Note    Date: 1/22/2025    Group Start Time: 1015  Group End Time: 1045  Group Topic: Psychoeducation    Mohsen Ford        Group Therapy Note    Attendees 11/20       Patient's Goal:  :  PT will demonstrate increased interpersonal interaction and participate in group activities of discussing journaling.   Notes:  :  Patient is making progress, AEB participating in group discussion, actively listening, and supporting other group members    Status After Intervention:  Unchanged    Participation Level: Active Listener and Interactive    Participation Quality: Appropriate, Attentive, and Sharing      Speech:  normal      Thought Process/Content: Logical      Affective Functioning: Flat      Mood: depressed      Level of consciousness:  Alert, Oriented x4, and Attentive      Response to Learning: Able to verbalize/acknowledge new learning and Progressing to goal      Endings: None Reported    Modes of Intervention: Education, Support, and Socialization      Discipline Responsible: /Counselor      Signature:  Mohsen Gaming

## 2025-01-22 NOTE — PLAN OF CARE
Problem: Self Harm/Suicidality  Goal: Will have no self-injury during hospital stay  Description: INTERVENTIONS:  1.  Ensure constant observer at bedside with Q15M safety checks  2.  Maintain a safe environment  3.  Secure patient belongings  4.  Ensure family/visitors adhere to safety recommendations  5.  Ensure safety tray has been added to patient's diet order  6.  Every shift and PRN: Re-assess suicidal risk via Frequent Screener    Note: Patient denies suicidal ideation and thoughts of harm to self and others.      Problem: Depression  Goal: Will be euthymic at discharge  Description: INTERVENTIONS:  1. Administer medication as ordered  2. Provide emotional support via 1:1 interaction with staff  3. Encourage involvement in milieu/groups/activities  4. Monitor for social isolation  Note: Patient rates endorses feeling depressed. Patient is wholly isolative to self and selectively social. Patient does attend therapeutic groups. Patient is more active in the day area and her treatment compared to yesterday. Patient reports feeling more anxious and reports periods of agitation after some groups. Patient reports a patient being a trigger. Patient is cooperative and does approach staff with needs and concerns.      Problem: Risk for Elopement  Goal: Patient will not exit the unit/facility without proper excort  Note: Patient has not demonstrated elopement risk behaviors on this shift.

## 2025-01-22 NOTE — PLAN OF CARE
Problem: Self Harm/Suicidality  Goal: Will have no self-injury during hospital stay  Description: INTERVENTIONS:  1.  Ensure constant observer at bedside with Q15M safety checks  2.  Maintain a safe environment  3.  Secure patient belongings  4.  Ensure family/visitors adhere to safety recommendations  5.  Ensure safety tray has been added to patient's diet order  6.  Every shift and PRN: Re-assess suicidal risk via Frequent Screener    1/21/2025 2137 by Yamel Maguire RN  Outcome: Not Progressing  Note: Patient denied thoughts of harming self and others. He remains free from self harm and injury     Problem: Depression  Goal: Will be euthymic at discharge  Description: INTERVENTIONS:  1. Administer medication as ordered  2. Provide emotional support via 1:1 interaction with staff  3. Encourage involvement in milieu/groups/activities  4. Monitor for social isolation  1/21/2025 2137 by Yamel Maguire RN  Outcome: Not Progressing  Note: Patient reports anxiety and depression to be 8/10. He is irritable and flat, avoiding gaze. He reported the auditory hallucinations are present but has been trying to stay In dayroom to shut them off. Patient was seen socializing in the dayroom. He was worried about medications causing an allergic reaction.      Problem: Chronic Conditions and Co-morbidities  Goal: Patient's chronic conditions and co-morbidity symptoms are monitored and maintained or improved  Outcome: Progressing     Problem: Risk for Elopement  Goal: Patient will not exit the unit/facility without proper excort  1/21/2025 2137 by Yamel Maguire RN  Outcome: Progressing  Note: Patient does not present exit seeking behaviors

## 2025-01-23 PROCEDURE — 1240000000 HC EMOTIONAL WELLNESS R&B

## 2025-01-23 PROCEDURE — 99232 SBSQ HOSP IP/OBS MODERATE 35: CPT | Performed by: PSYCHIATRY & NEUROLOGY

## 2025-01-23 PROCEDURE — 6370000000 HC RX 637 (ALT 250 FOR IP): Performed by: NURSE PRACTITIONER

## 2025-01-23 PROCEDURE — 6370000000 HC RX 637 (ALT 250 FOR IP): Performed by: PSYCHIATRY & NEUROLOGY

## 2025-01-23 PROCEDURE — 99231 SBSQ HOSP IP/OBS SF/LOW 25: CPT | Performed by: INTERNAL MEDICINE

## 2025-01-23 RX ORDER — OLANZAPINE 7.5 MG/1
7.5 TABLET, FILM COATED ORAL NIGHTLY
Status: DISCONTINUED | OUTPATIENT
Start: 2025-01-24 | End: 2025-01-27 | Stop reason: HOSPADM

## 2025-01-23 RX ORDER — OLANZAPINE 5 MG/1
2.5 TABLET ORAL ONCE
Status: COMPLETED | OUTPATIENT
Start: 2025-01-23 | End: 2025-01-23

## 2025-01-23 RX ADMIN — HYDROXYZINE HYDROCHLORIDE 50 MG: 50 TABLET ORAL at 22:55

## 2025-01-23 RX ADMIN — HYDROXYZINE HYDROCHLORIDE 50 MG: 50 TABLET ORAL at 16:07

## 2025-01-23 RX ADMIN — NICOTINE POLACRILEX 2 MG: 2 LOZENGE ORAL at 10:53

## 2025-01-23 RX ADMIN — NITROFURANTOIN MONOHYDRATE/MACROCRYSTALS 100 MG: 25; 75 CAPSULE ORAL at 08:50

## 2025-01-23 RX ADMIN — NICOTINE POLACRILEX 2 MG: 2 LOZENGE ORAL at 20:23

## 2025-01-23 RX ADMIN — NITROFURANTOIN MONOHYDRATE/MACROCRYSTALS 100 MG: 25; 75 CAPSULE ORAL at 21:05

## 2025-01-23 RX ADMIN — OLANZAPINE 5 MG: 5 TABLET, FILM COATED ORAL at 21:05

## 2025-01-23 RX ADMIN — OLANZAPINE 2.5 MG: 5 TABLET, FILM COATED ORAL at 22:54

## 2025-01-23 RX ADMIN — NICOTINE POLACRILEX 2 MG: 2 LOZENGE ORAL at 08:50

## 2025-01-23 RX ADMIN — Medication 3 MG: at 21:05

## 2025-01-23 NOTE — GROUP NOTE
Group Therapy Note    Date: 1/23/2025    Group Start Time: 2030  Group End Time: 2055  Group Topic: Topic Group    Sole Vazquez        Group Therapy Note    Attendees:        Patient's Goal:  safety    Notes:  cooperative    Status After Intervention:  Improved    Participation Level: Active Listener    Participation Quality: Appropriate      Speech:  normal      Thought Process/Content: Logical      Affective Functioning: Congruent      Mood: anxious      Level of consciousness:  Alert      Response to Learning: Able to verbalize current knowledge/experience and Able to verbalize/acknowledge new learning      Endings: None Reported    Modes of Intervention: Education, Support, and Limit-setting      Discipline Responsible: Registered Nurse      Signature:  Sole Choe

## 2025-01-23 NOTE — PLAN OF CARE
Problem: Depression  Goal: Will be euthymic at discharge  Description: INTERVENTIONS:  1. Administer medication as ordered  2. Provide emotional support via 1:1 interaction with staff  3. Encourage involvement in milieu/groups/activities  4. Monitor for social isolation  1/23/2025 1243 by Yakelin Antunez RN  Outcome: Progressing     Problem: Risk for Elopement  Goal: Patient will not exit the unit/facility without proper excort  1/23/2025 1627 by Jesi Hauser LPN  Outcome: Progressing  Note: Patient made no attempts to elope from unit      Problem: Self Harm/Suicidality  Goal: Will have no self-injury during hospital stay  Description: INTERVENTIONS:  1.  Ensure constant observer at bedside with Q15M safety checks  2.  Maintain a safe environment  3.  Secure patient belongings  4.  Ensure family/visitors adhere to safety recommendations  5.  Ensure safety tray has been added to patient's diet order  6.  Every shift and PRN: Re-assess suicidal risk via Frequent Screener    1/23/2025 1627 by Jesi Hauser LPN  Outcome: Progressing  Note: Patient denies suicidal/homicidal ideation. Patient agreeable to seek out staff should thoughts of self harm/harming others arise. Patient denies hallucinations. Patient is positive for anxiety/depression but does not rate. Patient is pleasant and cooperative, social with peers, attends groups. Patient is medication compliant and behavior controlled. Comfort and reassurance provided. Safety checks maintained every 15 minutes and as needed.

## 2025-01-23 NOTE — GROUP NOTE
Group Therapy Note    Date: 1/23/2025    Group Start Time: 1105  Group End Time: 1155  Group Topic: Cognitive Skills    Kristen Snider CTRS        Group Therapy Note    Attendees: 10/21     Topic: To increase socialization, practice decision making and explore perception           Comments:   Patient did not participate in Cognitive Skills Group, at 1105, despite staff encouragement.   Pt sat on periphery of group and talked with a peer throughout group. Pt did state she is starting to feel more relaxed on her meds.     Q15 minute safety checks maintained for patient safety and will continue to encourage   patient to attend unit programming.       Discipline Responsible: Psychoeducational Specialist   Signature: MIRZA ROBERTS

## 2025-01-23 NOTE — GROUP NOTE
Group Therapy Note    Date: 1/23/2025    Group Start Time: 1000  Group End Time: 1030  Group Topic: Psychotherapy    [facility]    Vonnie Gotti MSW        Group Therapy Note    Attendees: 11/20       Patient's Goal:  In teams, create a list of coping skills from A-Z; share with the group after and compare answers    Notes:     Status After Intervention:  Improved    Participation Level: Active Listener and Interactive    Participation Quality: Appropriate, Attentive, and Sharing      Speech:  normal      Thought Process/Content: Logical  Linear      Affective Functioning: Congruent      Mood: euthymic      Level of consciousness:  Alert and Oriented x4      Response to Learning: Able to verbalize current knowledge/experience and Able to verbalize/acknowledge new learning      Endings: None Reported    Modes of Intervention: Support, Socialization, and Activity      Discipline Responsible: /Counselor      Signature:  NATALIIA Black

## 2025-01-23 NOTE — PLAN OF CARE
Problem: Depression  Goal: Will be euthymic at discharge  Description: INTERVENTIONS:  1. Administer medication as ordered  2. Provide emotional support via 1:1 interaction with staff  3. Encourage involvement in milieu/groups/activities  4. Monitor for social isolation  1/23/2025 0306 by Kadeem Reed RN  Outcome: Progressing     Problem: Risk for Elopement  Goal: Patient will not exit the unit/facility without proper excort  1/23/2025 0306 by Kadeem Reed RN  Outcome: Progressing     Problem: Chronic Conditions and Co-morbidities  Goal: Patient's chronic conditions and co-morbidity symptoms are monitored and maintained or improved  Outcome: Progressing     Problem: Self Harm/Suicidality  Goal: Will have no self-injury during hospital stay  Description: INTERVENTIONS:  1.  Ensure constant observer at bedside with Q15M safety checks  2.  Maintain a safe environment  3.  Secure patient belongings  4.  Ensure family/visitors adhere to safety recommendations  5.  Ensure safety tray has been added to patient's diet order  6.  Every shift and PRN: Re-assess suicidal risk via Frequent Screener    1/23/2025 0306 by Kadeem Reed RN  Outcome: Progressing  Note: Patient denies thoughts to harm self and others. Patient was cooperative with assessment, he reports his mood is \"good for the most part\". He reports generalized anxiety at times and finds as needed atarax beneficial. Patient was compliant with scheduled medications and remains in control of behavior at this time. No exit seeking behaviors observed. Safe environment maintained, will continue to offer support.

## 2025-01-23 NOTE — PLAN OF CARE
[facility]  Day 3 Interdisciplinary Treatment Plan NOTE    Review Date & Time: 1/23/2025   1245    Admission Type:   Admission Type: Involuntary    Reason for admission:  Reason for Admission: Patient presents to the ED with suicidial thoughts to overdose on medications. Patient reports increased auditory hallucinations of multiple different voices having conversations. He also reports increased \"PTSD attacks\" cause inability to sleep  Estimated Length of Stay: 5-7 days  Estimated Discharge Date Update: to be determined by physician    PATIENT STRENGTHS:  Patient Strengths    Patient Strengths and Limitations:Limitations: Lacks leisure interests, Difficulty problem solving/relies on others to help solve problems  Addictive Behavior:Addictive Behavior  In the Past 3 Months, Have You Felt or Has Someone Told You That You Have a Problem With  : None  Medical Problems:  Past Medical History:   Diagnosis Date    Anxiety     Depression        Risk:  Fall Risk   Juan Scale Juan Scale Score: 22  BVC    Change in scores no Changes to plan of Care no    Status EXAM:   Mental Status and Behavioral Exam  Normal: No  Level of Assistance: Independent/Self  Facial Expression: Flat  Affect: Blunt  Level of Consciousness: Alert  Frequency of Checks: 4 times per hour, close  Mood:Normal: No  Mood: Depressed, Anxious  Motor Activity:Normal: Yes  Eye Contact: Fair  Observed Behavior: Cooperative, Preoccupied  Sexual Misconduct History: Current - no  Preception: Seco to person, Seco to time, Seco to place, Seco to situation  Attention:Normal: No  Attention: Distractible  Thought Processes: Unremarkable  Thought Content:Normal: No  Thought Content: Preoccupations  Depression Symptoms: Feelings of helplessness, Feelings of hopelessess, Impaired concentration  Anxiety Symptoms: Generalized  Estee Symptoms: No problems reported or observed.  Hallucinations: Auditory (comment)  Delusions: No  Memory:Normal:

## 2025-01-23 NOTE — GROUP NOTE
Group Therapy Note    Date: 1/23/2025    Group Start Time: 1430  Group End Time: 1525  Group Topic: Cognitive Skills    [facility] Kristen Price CTRS        Group Therapy Note    Attendees: 7/18     Patient's Goal: To increase socialization, practice problem solving skills, proritizing, and colaboration with peers in a Crisis Management Task.           Notes: Pt was pleasant and cooperative and participated in group. Pt was able to practice problem solving skills, proritizing, and colaboration with peers in a Crisis Management Task.independently.          Status After Intervention:  Improved     Participation Level:  Attentive, sharing , supportive     Participation Quality: Attentive, appropriate, sharing , supportive        Speech:  Normal        Thought Process/Content: Logical, linear        Affective Functioning: Congruent, brightened        Mood: Euthymic        Level of consciousness:  Attentive and Alert        Response to Learning: Able to verbalize current knowledge/experience, Able to verbalize/acknowledge new learning, and Progressing to goal        Endings: None Reported     Modes of Intervention: Education, Support, Exploration, Clarifying, and Problem solving        Discipline Responsible: Psychoeducational Specialist        Signature:  MIRZA ROBERTS

## 2025-01-24 PROCEDURE — 99232 SBSQ HOSP IP/OBS MODERATE 35: CPT | Performed by: PSYCHIATRY & NEUROLOGY

## 2025-01-24 PROCEDURE — 99232 SBSQ HOSP IP/OBS MODERATE 35: CPT | Performed by: INTERNAL MEDICINE

## 2025-01-24 PROCEDURE — 6370000000 HC RX 637 (ALT 250 FOR IP): Performed by: PSYCHIATRY & NEUROLOGY

## 2025-01-24 PROCEDURE — 6370000000 HC RX 637 (ALT 250 FOR IP): Performed by: NURSE PRACTITIONER

## 2025-01-24 PROCEDURE — 1240000000 HC EMOTIONAL WELLNESS R&B

## 2025-01-24 RX ADMIN — Medication 3 MG: at 21:18

## 2025-01-24 RX ADMIN — NICOTINE POLACRILEX 4 MG: 4 LOZENGE ORAL at 20:09

## 2025-01-24 RX ADMIN — NITROFURANTOIN MONOHYDRATE/MACROCRYSTALS 100 MG: 25; 75 CAPSULE ORAL at 09:28

## 2025-01-24 RX ADMIN — NICOTINE POLACRILEX 2 MG: 2 LOZENGE ORAL at 18:29

## 2025-01-24 RX ADMIN — NITROFURANTOIN MONOHYDRATE/MACROCRYSTALS 100 MG: 25; 75 CAPSULE ORAL at 21:18

## 2025-01-24 RX ADMIN — OLANZAPINE 7.5 MG: 7.5 TABLET, FILM COATED ORAL at 21:18

## 2025-01-24 NOTE — GROUP NOTE
Group Therapy Note    Date: 1/24/2025    Group Start Time: 0900  Group End Time: 0945  Group Topic: Community Meeting    Sara Hebert        Group Therapy Note    Attendees: 7/18     Community Meeting Group Note        Date: January 24, 2025 Start Time: 9am  End Time:  0945      Number of Participants in Group & Unit Census:  7/18    Topic: goals    Goal of Group: set short term goal for the day      Comments:     Patient did not participate in Community Meeting group, despite staff encouragement and explanation of benefits.  Patient remain seclusive to self.  Q15 minute safety checks maintained for patient safety and will continue to encourage patient to attend unit programming.

## 2025-01-24 NOTE — GROUP NOTE
Group Therapy Note    Date: 1/24/2025    Group Start Time: 1330  Group End Time: 1400  Group Topic: Healthy Living/Wellness    Sara Hebert        Group Therapy Note    Attendees: 7/19       Health/Wellness Group Note        Date: January 24, 2025 Start Time: 1:30pm  End Time:  1400      Number of Participants in Group & Unit Census:  7/19    Topic: Meditation    Goal of Group: different types of meditation, 10 minute guided meditation video      Comments:     Patient did not participate in Health/Wellness group, despite staff encouragement and explanation of benefits.  Patient remain seclusive to self.  Q15 minute safety checks maintained for patient safety and will continue to encourage patient to attend unit programming.

## 2025-01-24 NOTE — GROUP NOTE
Group Therapy Note    Date: 1/24/2025    Group Start Time: 1000  Group End Time: 1050  Group Topic: Psychotherapy    [facility]    Catherine Holbrook MSW, SHASHI        Group Therapy Note    Attendees: 7/19     Patient refused to attend psychotherapy group at 10:00 am after encouragement from staff. 1:1 talk was offered as alternative to group; patient declined.        Discipline Responsible: /Counselor      Signature:  NATALIIA Awad, SHASHI

## 2025-01-24 NOTE — GROUP NOTE
Group Therapy Note    Date: 1/24/2025    Group Start Time: 1100  Group End Time: 1155  Group Topic: Cognitive Skills    Kristen Snider CTRS        Group Therapy Note    Attendees: 7/19     Patient's Goal: To increase socialization, practice decision making skills and concentration            Notes: Pt was pleasant and cooperative but did not participate in group due to he stated that task reminded him of his Grandmother and sad memories. Pt remained in group writing in journal and also talking and being social with RT and peers.         Status After Intervention:  Improved     Participation Level:  Attentive, sharing , supportive     Participation Quality: Attentive, appropriate, sharing , supportive        Speech:  Normal        Thought Process/Content: Logical, linear        Affective Functioning: Congruent, brightened        Mood: Euthymic        Level of consciousness:  Attentive and Alert        Response to Learning: Able to verbalize current knowledge/experience, and Progressing to goal        Endings: None Reported     Modes of Intervention: Education, Support, Exploration, Clarifying, and Problem solving        Discipline Responsible: Psychoeducational Specialist        Signature:  MIRZA ROBERTS

## 2025-01-24 NOTE — PLAN OF CARE
Problem: Self Harm/Suicidality  Goal: Will have no self-injury during hospital stay  Description: INTERVENTIONS:  1.  Ensure constant observer at bedside with Q15M safety checks  2.  Maintain a safe environment  3.  Secure patient belongings  4.  Ensure family/visitors adhere to safety recommendations  5.  Ensure safety tray has been added to patient's diet order  6.  Every shift and PRN: Re-assess suicidal risk via Frequent Screener    Outcome: Progressing     Problem: Depression  Goal: Will be euthymic at discharge  Description: INTERVENTIONS:  1. Administer medication as ordered  2. Provide emotional support via 1:1 interaction with staff  3. Encourage involvement in milieu/groups/activities  4. Monitor for social isolation  Outcome: Progressing     Problem: Risk for Elopement  Goal: Patient will not exit the unit/facility without proper excort  Outcome: Progressing

## 2025-01-24 NOTE — PLAN OF CARE
Problem: Self Harm/Suicidality  Goal: Will have no self-injury during hospital stay  Description: INTERVENTIONS:  1.  Ensure constant observer at bedside with Q15M safety checks  2.  Maintain a safe environment  3.  Secure patient belongings  4.  Ensure family/visitors adhere to safety recommendations  5.  Ensure safety tray has been added to patient's diet order  6.  Every shift and PRN: Re-assess suicidal risk via Frequent Screener    1/23/2025 2313 by Lakeshia Schmidt LPN  Outcome: Progressing  Note: Pt has had suicidal thoughts but denies denies ideations at this time. Pt agreed to seek staff at anytime he felt like anymore thoughts or any urges to harm self become to hard to manage alone. Safety checks maintained [id number].     Problem: Risk for Elopement  Goal: Patient will not exit the unit/facility without proper excort  1/23/2025 2313 by Lakeshia Schmidt LPN  Outcome: Progressing  Note: Patient has not shown any signs of elopement at this time. Patient understands why he is here, and states he wants to be here for help.

## 2025-01-24 NOTE — GROUP NOTE
Group Therapy Note    Date: 1/24/2025    Group Start Time: 1430  Group End Time: 1545  Group Topic: Activity    [facility]        Group Therapy Note    Attendees: 10/19     Patient's Goal: To increase socialization, practice creative expression and explore art and music as positive coping activities.            Notes: Pt was pleasant and cooperative and participated in group. Pt was able to practice creative expression and explore art and music as positive coping activities. Pt made individual choices and shared, and also engaged in discussion with RT and peers r/t topic.        Status After Intervention:  Improved     Participation Level:  Attentive, sharing , supportive     Participation Quality: Attentive, appropriate, sharing , supportive        Speech:  Normal        Thought Process/Content: Logical, linear        Affective Functioning: Congruent, brightened        Mood: Euthymic.        Level of consciousness:  Attentive and Alert        Response to Learning: Able to verbalize current knowledge/experience, Able to verbalize/acknowledge new learning, and Progressing to goal        Endings: None Reported     Modes of Intervention: Education, Support, Exploration, Clarifying, and Problem solving        Discipline Responsible: Psychoeducational Specialist        Signature:  MIRZA ROBERTS

## 2025-01-25 PROCEDURE — 6370000000 HC RX 637 (ALT 250 FOR IP): Performed by: NURSE PRACTITIONER

## 2025-01-25 PROCEDURE — 99232 SBSQ HOSP IP/OBS MODERATE 35: CPT | Performed by: PSYCHIATRY & NEUROLOGY

## 2025-01-25 PROCEDURE — 99231 SBSQ HOSP IP/OBS SF/LOW 25: CPT | Performed by: INTERNAL MEDICINE

## 2025-01-25 PROCEDURE — 6370000000 HC RX 637 (ALT 250 FOR IP): Performed by: PSYCHIATRY & NEUROLOGY

## 2025-01-25 PROCEDURE — 1240000000 HC EMOTIONAL WELLNESS R&B

## 2025-01-25 RX ADMIN — NITROFURANTOIN MONOHYDRATE/MACROCRYSTALS 100 MG: 25; 75 CAPSULE ORAL at 21:44

## 2025-01-25 RX ADMIN — OLANZAPINE 7.5 MG: 7.5 TABLET, FILM COATED ORAL at 21:44

## 2025-01-25 RX ADMIN — IBUPROFEN 400 MG: 400 TABLET, FILM COATED ORAL at 17:56

## 2025-01-25 RX ADMIN — Medication 3 MG: at 21:44

## 2025-01-25 RX ADMIN — NICOTINE POLACRILEX 4 MG: 4 LOZENGE ORAL at 17:56

## 2025-01-25 RX ADMIN — NICOTINE POLACRILEX 4 MG: 4 LOZENGE ORAL at 21:51

## 2025-01-25 RX ADMIN — NICOTINE POLACRILEX 4 MG: 4 LOZENGE ORAL at 08:51

## 2025-01-25 RX ADMIN — HYDROXYZINE HYDROCHLORIDE 50 MG: 50 TABLET ORAL at 19:04

## 2025-01-25 RX ADMIN — NITROFURANTOIN MONOHYDRATE/MACROCRYSTALS 100 MG: 25; 75 CAPSULE ORAL at 08:51

## 2025-01-25 RX ADMIN — NICOTINE POLACRILEX 4 MG: 4 LOZENGE ORAL at 19:03

## 2025-01-25 ASSESSMENT — PAIN SCALES - GENERAL: PAINLEVEL_OUTOF10: 5

## 2025-01-25 ASSESSMENT — PAIN DESCRIPTION - LOCATION: LOCATION: HEAD

## 2025-01-25 ASSESSMENT — PAIN DESCRIPTION - DESCRIPTORS: DESCRIPTORS: ACHING;DISCOMFORT

## 2025-01-25 ASSESSMENT — LIFESTYLE VARIABLES: HOW MANY STANDARD DRINKS CONTAINING ALCOHOL DO YOU HAVE ON A TYPICAL DAY: PATIENT DOES NOT DRINK

## 2025-01-25 NOTE — GROUP NOTE
Group Therapy Note    Date: 1/25/2025    Group Start Time: 0900  Group End Time: 0930  Group Topic: Community Meeting    Daisy Dennis LPN        Group Therapy Note    Attendees: 5/21       Patient's Goal:  Speak with dr        Status After Intervention:  Improved    Participation Level: Active Listener and Interactive    Participation Quality: Appropriate, Attentive, Sharing, and Supportive      Speech:  normal      Thought Process/Content: Logical  Linear      Affective Functioning: Flat      Mood: depressed      Level of consciousness:  Alert, Oriented x4, and Attentive      Response to Learning: Able to verbalize current knowledge/experience, Able to verbalize/acknowledge new learning, Able to retain information, Capable of insight, Able to change behavior, and Progressing to goal      Endings: None Reported    Modes of Intervention: Education, Support, and Socialization      Discipline Responsible: Licensed Practical Nurse      Signature:  Daisy Hall LPN

## 2025-01-25 NOTE — GROUP NOTE
Group Therapy Note    Date: 1/24/2025    Group Start Time: 2030  Group End Time: 2115  Group Topic: Wrap-Up    [facility] Deisy Jeffrey        Group Therapy Note    Attendees: 8/17       Patient's Goal:  To review daily goals.    Notes:  Patient attended and participated in group. Patient stated coming to groups today was her goal and is focused on seeking a PTSD therapist once discharged.    Status After Intervention:  Improved    Participation Level: Active Listener and Interactive    Participation Quality: Appropriate, Attentive, and Sharing      Speech:  normal      Thought Process/Content: Logical      Affective Functioning: Congruent      Mood: euthymic      Level of consciousness:  Alert, Oriented x4, and Attentive      Response to Learning: Able to verbalize current knowledge/experience, Able to verbalize/acknowledge new learning, and Able to retain information      Endings: None Reported    Modes of Intervention: Education, Support, and Socialization      Discipline Responsible: Behavorial Health Tech      Signature:  Deisy Hays

## 2025-01-25 NOTE — PLAN OF CARE
Problem: Self Harm/Suicidality  Goal: Will have no self-injury during hospital stay  Description: INTERVENTIONS:  1.  Ensure constant observer at bedside with Q15M safety checks  2.  Maintain a safe environment  3.  Secure patient belongings  4.  Ensure family/visitors adhere to safety recommendations  5.  Ensure safety tray has been added to patient's diet order  6.  Every shift and PRN: Re-assess suicidal risk via Frequent Screener    1/25/2025 0333 by Ioana Qureshi RN  Outcome: Progressing     Problem: Depression  Goal: Will be euthymic at discharge  Description: INTERVENTIONS:  1. Administer medication as ordered  2. Provide emotional support via 1:1 interaction with staff  3. Encourage involvement in milieu/groups/activities  4. Monitor for social isolation  1/25/2025 0333 by Ioana Qureshi, RN  Outcome: Progressing  Note:   Patient is alert and social with peers in dayroom  Patient endorses depression 5/10 and anxiety 5/10.Patient endorses suicidal thoughts improving. Patient denies the presence of self harm. Patient is agreeable to seek out staff should feelings worsen or arise. Patient denies the presence of auditory and visual hallucinations. Every 15 minute safety checks maintained.

## 2025-01-26 PROCEDURE — 6370000000 HC RX 637 (ALT 250 FOR IP): Performed by: PSYCHIATRY & NEUROLOGY

## 2025-01-26 PROCEDURE — 1240000000 HC EMOTIONAL WELLNESS R&B

## 2025-01-26 PROCEDURE — 99232 SBSQ HOSP IP/OBS MODERATE 35: CPT | Performed by: PSYCHIATRY & NEUROLOGY

## 2025-01-26 PROCEDURE — 99231 SBSQ HOSP IP/OBS SF/LOW 25: CPT | Performed by: INTERNAL MEDICINE

## 2025-01-26 PROCEDURE — 6370000000 HC RX 637 (ALT 250 FOR IP): Performed by: NURSE PRACTITIONER

## 2025-01-26 RX ADMIN — NICOTINE POLACRILEX 4 MG: 4 LOZENGE ORAL at 18:13

## 2025-01-26 RX ADMIN — NICOTINE POLACRILEX 4 MG: 4 LOZENGE ORAL at 19:34

## 2025-01-26 RX ADMIN — Medication 3 MG: at 21:07

## 2025-01-26 RX ADMIN — NICOTINE POLACRILEX 4 MG: 4 LOZENGE ORAL at 08:54

## 2025-01-26 RX ADMIN — OLANZAPINE 7.5 MG: 7.5 TABLET, FILM COATED ORAL at 21:08

## 2025-01-26 RX ADMIN — HYDROXYZINE HYDROCHLORIDE 50 MG: 50 TABLET ORAL at 18:38

## 2025-01-26 NOTE — GROUP NOTE
Group Therapy Note    Date: 1/26/2025    Group Start Time: 0900  Group End Time: 1000  Group Topic: Focus Group    [facility]    Jennifer Huertas LPN        Group Therapy Note    Attendees: 7/10       Patient's Goal:  To set boundaries with family    Notes:  Patient optimistic about setting goals     Status After Intervention:  Improved    Participation Level: Active Listener and Interactive    Participation Quality: Appropriate, Attentive, Sharing, and Supportive      Speech:  normal      Thought Process/Content: Logical      Affective Functioning: Congruent      Mood: depressed      Level of consciousness:  Alert and Oriented x4      Response to Learning: Able to verbalize current knowledge/experience and Able to verbalize/acknowledge new learning      Endings: None Reported    Modes of Intervention: Education, Support, and Clarifying      Discipline Responsible: Licensed Practical Nurse      Signature:  Jennifer Huertas LPN

## 2025-01-26 NOTE — PLAN OF CARE
Problem: Chronic Conditions and Co-morbidities  Goal: Patient's chronic conditions and co-morbidity symptoms are monitored and maintained or improved  Note: Pt was concerned about if patient would be receiving testosterone tomorrow when it was due. Writer perfect served Provider, who noted there was concern that testosterone was contributing to psychosis. Writer discussed this with patient. Patient expressed that      Problem: Self Harm/Suicidality  Goal: Will have no self-injury during hospital stay  Description: INTERVENTIONS:  1.  Ensure constant observer at bedside with Q15M safety checks  2.  Maintain a safe environment  3.  Secure patient belongings  4.  Ensure family/visitors adhere to safety recommendations  5.  Ensure safety tray has been added to patient's diet order  6.  Every shift and PRN: Re-assess suicidal risk via Frequent Screener    Note: Pt denies suicidal ideation and thoughts of harm to self and others.      Problem: Depression  Goal: Will be euthymic at discharge  Description: INTERVENTIONS:  1. Administer medication as ordered  2. Provide emotional support via 1:1 interaction with staff  3. Encourage involvement in milieu/groups/activities  4. Monitor for social isolation  Note: Patient endorses feeling depressed, but does note feels better and is moving in the right direction. Patient is social and friendly with peers. Out in day area most of shift.      Problem: Risk for Elopement  Goal: Patient will not exit the unit/facility without proper excort  Note: Patient has not demonstrated elopement risk behaviors on this shift.

## 2025-01-26 NOTE — PLAN OF CARE
Problem: Depression  Goal: Will be euthymic at discharge  Description: INTERVENTIONS:  1. Administer medication as ordered  2. Provide emotional support via 1:1 interaction with staff  3. Encourage involvement in milieu/groups/activities  4. Monitor for social isolation  1/25/2025 2216 by Ioana Qureshi, RN  Outcome: Progressing  Note: Patient has reports improvement in symptoms of depression to 3/10, with 10 being the worst. Patient reports fleeting thoughts of self harm, with no plan, Patient is social and interactive in dayroom, compliant with medications. Patient has not voiced any desire to elope or displayed any behaviors consistent with desire to escape, despite being very stressed regarding Testosterone being postponed tomorrow. Rounding every 15 minutes and incidentally to ensure safety on unit.       Problem: Risk for Elopement  Goal: Patient will not exit the unit/facility without proper excort  1/25/2025 2216 by Ioana Qureshi, RN  Outcome: Progressing

## 2025-01-26 NOTE — PLAN OF CARE
Problem: Chronic Conditions and Co-morbidities  Goal: Patient's chronic conditions and co-morbidity symptoms are monitored and maintained or improved  Outcome: Progressing   Chronic conditions are monitored   Problem: Self Harm/Suicidality  Goal: Will have no self-injury during hospital stay  Description: INTERVENTIONS:  1.  Ensure constant observer at bedside with Q15M safety checks  2.  Maintain a safe environment  3.  Secure patient belongings  4.  Ensure family/visitors adhere to safety recommendations  5.  Ensure safety tray has been added to patient's diet order  6.  Every shift and PRN: Re-assess suicidal risk via Frequent Screener    Outcome: Progressing   Patient denies thoughts of self harm  Problem: Depression  Goal: Will be euthymic at discharge  Description: INTERVENTIONS:  1. Administer medication as ordered  2. Provide emotional support via 1:1 interaction with staff  3. Encourage involvement in milieu/groups/activities  4. Monitor for social isolation  Outcome: Progressing   Miss Boateng is seen in her room, affect is flat she is irritated about not having her Testerone. Feels that she is stable, reports hallucinations are better no issues with sleep or appetite.   Problem: Risk for Elopement  Goal: Patient will not exit the unit/facility without proper excort  Outcome: Progressing   Patient exhibits no exit seeking behaviors

## 2025-01-26 NOTE — GROUP NOTE
Group Therapy Note    Date: 2025    Group Start Time: 0900  Group End Time: 1000  Group Topic: Focus Group    [facility] Jennifer Weldon LPN        Group Therapy Note    Attendees: 15/21         Patient's Goal:  ***    Notes:  ***    Status After Intervention:  {Status After Intervention:366387122}    Participation Level: {Participation Level:760560123}    Participation Quality: {[facility] PARTICIPATION QUALITY:769663743}      Speech:  {ED  CD_SPEECH:52612}      Thought Process/Content: {Thought Process/Content:330104457}      Affective Functioning: {Affective Functionin}      Mood: {Mood:740130040}      Level of consciousness:  {Level of consciousness:256533592}      Response to Learning: {[facility] Responses to Learnin}      Endings: {[facility] Endings:86340}    Modes of Intervention: {[facility] Modes of Intervention:738860719}      Discipline Responsible: {[facility] Multidisciplinary:744608759}      Signature:  Jennifer Huertas LPN

## 2025-01-26 NOTE — GROUP NOTE
Group Therapy Note    Date: 1/26/2025    Group Start Time: 1045  Group End Time: 1120  Group Topic: Psychoeducation    Mohsen Ford        Group Therapy Note    Attendees: 9/22       Patient's Goal:  PT will participate actively in group discussion using conversation cubes.     Notes:  Patient is making progress, AEB participating in group discussion, actively listening, and supporting other group members.    Status After Intervention:  Improved    Participation Level: Active Listener and Interactive    Participation Quality: Appropriate, Attentive, and Sharing      Speech:  normal      Thought Process/Content: Logical      Affective Functioning: Flat      Mood: depressed      Level of consciousness:  Alert, Oriented x4, and Attentive      Response to Learning: Able to verbalize/acknowledge new learning and Progressing to goal      Endings: None Reported    Modes of Intervention: Education, Support, and Socialization      Discipline Responsible: /Counselor      Signature:  Mohsen Gaming

## 2025-01-27 VITALS
BODY MASS INDEX: 18.4 KG/M2 | DIASTOLIC BLOOD PRESSURE: 65 MMHG | OXYGEN SATURATION: 100 % | WEIGHT: 100 LBS | HEIGHT: 62 IN | SYSTOLIC BLOOD PRESSURE: 94 MMHG | HEART RATE: 71 BPM | RESPIRATION RATE: 14 BRPM | TEMPERATURE: 97.7 F

## 2025-01-27 PROCEDURE — 99239 HOSP IP/OBS DSCHRG MGMT >30: CPT | Performed by: PSYCHIATRY & NEUROLOGY

## 2025-01-27 PROCEDURE — 99231 SBSQ HOSP IP/OBS SF/LOW 25: CPT | Performed by: INTERNAL MEDICINE

## 2025-01-27 PROCEDURE — 6370000000 HC RX 637 (ALT 250 FOR IP): Performed by: NURSE PRACTITIONER

## 2025-01-27 PROCEDURE — 6370000000 HC RX 637 (ALT 250 FOR IP): Performed by: PSYCHIATRY & NEUROLOGY

## 2025-01-27 RX ORDER — OLANZAPINE 7.5 MG/1
7.5 TABLET, FILM COATED ORAL NIGHTLY
Qty: 30 TABLET | Refills: 3 | Status: SHIPPED | OUTPATIENT
Start: 2025-01-27

## 2025-01-27 RX ORDER — HYDROXYZINE HYDROCHLORIDE 50 MG/1
50 TABLET, FILM COATED ORAL 3 TIMES DAILY PRN
Qty: 30 TABLET | Refills: 0 | Status: SHIPPED | OUTPATIENT
Start: 2025-01-27 | End: 2025-02-06

## 2025-01-27 RX ORDER — OLANZAPINE 7.5 MG/1
7.5 TABLET, FILM COATED ORAL NIGHTLY
Qty: 30 TABLET | Refills: 3 | Status: SHIPPED | OUTPATIENT
Start: 2025-01-27 | End: 2025-01-27

## 2025-01-27 RX ADMIN — NICOTINE POLACRILEX 4 MG: 4 LOZENGE ORAL at 09:45

## 2025-01-27 RX ADMIN — HYDROXYZINE HYDROCHLORIDE 50 MG: 50 TABLET ORAL at 11:54

## 2025-01-27 RX ADMIN — NICOTINE POLACRILEX 4 MG: 4 LOZENGE ORAL at 12:57

## 2025-01-27 NOTE — PLAN OF CARE
Problem: Self Harm/Suicidality  Goal: Will have no self-injury during hospital stay  Description: INTERVENTIONS:  1.  Ensure constant observer at bedside with Q15M safety checks  2.  Maintain a safe environment  3.  Secure patient belongings  4.  Ensure family/visitors adhere to safety recommendations  5.  Ensure safety tray has been added to patient's diet order  6.  Every shift and PRN: Re-assess suicidal risk via Frequent Screener    1/26/2025 2153 by Ioana Qureshi, RN  Outcome: Progressing  Note:   Patient is alert to person, place, and time. Patient endorses depression 5/10 and anxiety 4/10 .Patient denies the presence of any suicidal ideation. Patient denies the presence of self harm. Patient is agreeable to seek out staff should feelings worsen or arise. Patient denies the presence of auditory and visual hallucinations. Every 15 minute safety checks maintained.                     Problem: Depression  Goal: Will be euthymic at discharge  Description: INTERVENTIONS:  1. Administer medication as ordered  2. Provide emotional support via 1:1 interaction with staff  3. Encourage involvement in milieu/groups/activities  4. Monitor for social isolation  1/26/2025 2153 by Ioana Qureshi, RN  Outcome: Progressing

## 2025-01-27 NOTE — GROUP NOTE
Group Therapy Note    Date: 1/26/2025    Group Start Time: 2015  Group End Time: 2045  Group Topic: Wrap-Up    Gilbert Hernandez           Patient's Goal:  Patient was quiet during group but did attend.           Signature:  Gilbert Diaz

## 2025-01-27 NOTE — BH NOTE
[facility]  Discharge Note    Pt discharged with followings belongings:   Dental Appliances: None  Vision - Corrective Lenses: None  Hearing Aid: None  Jewelry: None  Body Piercings Removed: N/A  Clothing: Hat, Jacket/Coat, Shirt, Pants  Other Valuables: Keys   Valuables sent home with patient or returned to patient. Patient educated on aftercare instructions: Yes  Information faxed to [facility] by staff  at 2:15 PM .Patient verbalize understanding of AVS:  Yes.    Status EXAM upon discharge:  Mental Status and Behavioral Exam  Normal: No  Level of Assistance: Independent/Self  Facial Expression: Brightened  Affect: Appropriate  Level of Consciousness: Alert  Frequency of Checks: 4 times per hour, close  Mood:Normal: No  Mood: Depressed, Anxious  Motor Activity:Normal: Yes  Motor Activity: Increased  Eye Contact: Good  Observed Behavior: Cooperative, Friendly, Preoccupied  Sexual Misconduct History: Current - no  Preception: [place] to person, [place] to time, [place] to place, [place] to situation  Attention:Normal: Yes  Attention: Distractible  Thought Processes: Unremarkable  Thought Content:Normal: No  Thought Content: Preoccupations  Depression Symptoms: Feelings of helplessness, Feelings of hopelessess  Anxiety Symptoms: Generalized  Estee Symptoms: No problems reported or observed.  Hallucinations: None  Delusions: No  Memory:Normal: Yes  Insight and Judgment: No  Insight and Judgment: Poor judgment, Poor insight    Tobacco Screening:  Practical Counseling, on admission, kevin X, if applicable and completed (first 3 are required if patient doesn't refuse)x:            ( ) Recognizing danger situations (included triggers and roadblocks)                    ( ) Coping skills (new ways to manage stress,relaxation techniques, changing routine, distraction)                                                           ( ) Basic information about quitting (benefits of quitting, techniques in how to quit,

## 2025-01-27 NOTE — GROUP NOTE
Group Therapy Note    Date: 1/27/2025    Group Start Time: 0915  Group End Time: 0935  Group Topic: Group Documentation    [facility]    Janie Green LPN        Group Therapy Note    Attendees: 10/13       Patient's Goal:  Try to be patient while waiting for discharge    Status After Intervention:  Improved    Participation Level: Active Listener and Interactive    Participation Quality: Appropriate, Attentive, and Sharing      Speech:  normal      Thought Process/Content: Logical  Linear      Affective Functioning: Congruent      Mood: euthymic      Level of consciousness:  Alert, Oriented x4, and Attentive      Response to Learning: Able to verbalize current knowledge/experience, Capable of insight, and Progressing to goal      Endings: None Reported    Modes of Intervention: Education, Support, and Socialization      Discipline Responsible: Licensed Practical Nurse      Signature:  Janie Green LPN

## 2025-01-27 NOTE — PROGRESS NOTES
Behavioral Services  Medicare Certification Upon Admission    I certify that this patient's inpatient psychiatric hospital admission is medically necessary for:    [x] (1) Treatment which could reasonably be expected to improve this patient's condition,       [x] (2) Or for diagnostic study;     AND     [x](2) The inpatient psychiatric services are provided while the individual is under the care of a physician and are included in the individualized plan of care.    Estimated length of stay/service 2-9 days    Plan for post-hospital care -outpatient care    Electronically signed by GUME SIEGEL MD on 1/21/2025 at 9:31 AM      
    [facility] Internal Medicine  Jay Alicea MD; Reagan Contreras MD, Daniel Jiang MD, Kesha Fernandez MD, Vitaliy Sanchez MD; Samantha Perales MD    [facility] Internal Medicine   IN-PATIENT SERVICE   [facility]    Progress note            Date:   1/23/2025  Patient name:  Tasneem Martínez  Date of admission:  1/20/2025 11:29 PM  MRN:   [id number]  Account:  [id number]  YOB: 1995  PCP:    No primary care provider on file.  Room:   [address] [place] [address]  Code Status:    Full Code      Chief Complaint:     Suicidal /Ac Psychosis    History Obtained From:     Patient/EMR/bedside RN     History of Present Illness:   29-year-old female with underlying history of anxiety, depression, UTI admitted to inpatient psych with suicidal ideations      Past Medical History:     Past Medical History:   Diagnosis Date    Anxiety     Depression         Past Surgical History:     Past Surgical History:   Procedure Laterality Date    HAND SURGERY Right 2014    ligament repair    HAND SURGERY Right     ligament repair        Medications Prior to Admission:     Prior to Admission medications    Medication Sig Start Date End Date Taking? Authorizing Provider   testosterone cypionate (DEPOTESTOTERONE CYPIONATE) 200 MG/ML injection Inject 0.25 mLs into the skin every 7 days. 11/11/24 2/9/25  Taz Clayton MD   nitrofurantoin, macrocrystal-monohydrate, (MACROBID) 100 MG capsule Take 1 capsule by mouth 2 times daily 1/20/25 1/25/25  Taz Clayton MD        Allergies:     Latex, Haloperidol, Haloperidol lactate, and Sulfa antibiotics    Social History:     Tobacco:    reports that she quit smoking about 7 years ago. Her smoking use included cigarettes. She has never used smokeless tobacco.  Alcohol:      reports current alcohol use of about 12.0 standard drinks of alcohol per week.  Drug Use:  reports current drug use. Drug: Marijuana (Weed).    Family History: 
    [facility] Internal Medicine  Jay Alicea MD; Reagan Contreras MD, Daniel Jiang MD, Kesha Fernandez MD, Vitaliy Sanchez MD; Samantha Perales MD    [facility] Internal Medicine   IN-PATIENT SERVICE   [facility]     HISTORY AND PHYSICAL EXAMINATION            Date:   1/27/2025  Patient name:  Tasneem Martínez  Date of admission:  1/20/2025 11:29 PM  MRN:   [id number]  Account:  [id number]  YOB: 1995  PCP:    No primary care provider on file.  Room:   [address] [place] [address]  Code Status:    Full Code      Chief Complaint:     Suicidal /Ac Psychosis    History Obtained From:     Patient/EMR/bedside RN     History of Present Illness:   29-year-old female with underlying history of anxiety, depression, UTI admitted to inpatient psych with suicidal ideations      Past Medical History:     Past Medical History:   Diagnosis Date    Anxiety     Depression         Past Surgical History:     Past Surgical History:   Procedure Laterality Date    HAND SURGERY Right 2014    ligament repair    HAND SURGERY Right     ligament repair        Medications Prior to Admission:     Prior to Admission medications    Medication Sig Start Date End Date Taking? Authorizing Provider   hydrOXYzine HCl (ATARAX) 50 MG tablet Take 1 tablet by mouth 3 times daily as needed for Anxiety 1/27/25 2/6/25 Yes Aftab Bird MD   OLANZapine (ZYPREXA) 7.5 MG tablet Take 1 tablet by mouth nightly 1/27/25  Yes Aftab Bird MD        Allergies:     Latex, Haloperidol, Haloperidol lactate, and Sulfa antibiotics    Social History:     Tobacco:    reports that she quit smoking about 7 years ago. Her smoking use included cigarettes. She has never used smokeless tobacco.  Alcohol:      reports current alcohol use of about 12.0 standard drinks of alcohol per week.  Drug Use:  reports current drug use. Drug: Marijuana (Weed).    Family History:     Family History   Problem Relation Age of Onset    High 
    [facility] Internal Medicine  Jay Alicea MD; Reagan Contreras MD, Daniel Jiang MD, Kesha Fernandez MD, Vitaliy Sanchez MD; Samantha Perales MD    [facility] Internal Medicine   IN-PATIENT SERVICE   [facility]    Progress note            Date:   1/22/2025  Patient name:  Tasneem Martínez  Date of admission:  1/20/2025 11:29 PM  MRN:   [id number]  Account:  [id number]  YOB: 1995  PCP:    No primary care provider on file.  Room:   [address] [place] [address]  Code Status:    Full Code      Chief Complaint:     Suicidal /Ac Psychosis    History Obtained From:     Patient/EMR/bedside RN     History of Present Illness:   29-year-old female with underlying history of anxiety, depression, UTI admitted to inpatient psych with suicidal ideations      Past Medical History:     Past Medical History:   Diagnosis Date    Anxiety     Depression         Past Surgical History:     Past Surgical History:   Procedure Laterality Date    HAND SURGERY Right 2014    ligament repair    HAND SURGERY Right     ligament repair        Medications Prior to Admission:     Prior to Admission medications    Medication Sig Start Date End Date Taking? Authorizing Provider   testosterone cypionate (DEPOTESTOTERONE CYPIONATE) 200 MG/ML injection Inject 0.25 mLs into the skin every 7 days. 11/11/24 2/9/25  Taz Clayton MD   nitrofurantoin, macrocrystal-monohydrate, (MACROBID) 100 MG capsule Take 1 capsule by mouth 2 times daily 1/20/25 1/25/25  Taz Clayton MD        Allergies:     Latex, Haloperidol, Haloperidol lactate, and Sulfa antibiotics    Social History:     Tobacco:    reports that she quit smoking about 7 years ago. Her smoking use included cigarettes. She has never used smokeless tobacco.  Alcohol:      reports current alcohol use of about 12.0 standard drinks of alcohol per week.  Drug Use:  reports current drug use. Drug: Marijuana (Weed).    Family History: 
    [facility] Internal Medicine  Jay Alicea MD; Reagan Contreras MD, Daniel Jiang MD, Kesha Fernandez MD, Vitaliy Sanchez MD; Samantha Perales MD    [facility] Internal Medicine   IN-PATIENT SERVICE   [facility]     HISTORY AND PHYSICAL EXAMINATION            Date:   1/25/2025  Patient name:  Tasneem Martínez  Date of admission:  1/20/2025 11:29 PM  MRN:   [id number]  Account:  [id number]  YOB: 1995  PCP:    No primary care provider on file.  Room:   [address] [place] [address]  Code Status:    Full Code      Chief Complaint:     Suicidal /Ac Psychosis    History Obtained From:     Patient/EMR/bedside RN     History of Present Illness:   29-year-old female with underlying history of anxiety, depression, UTI admitted to inpatient psych with suicidal ideations      Past Medical History:     Past Medical History:   Diagnosis Date    Anxiety     Depression         Past Surgical History:     Past Surgical History:   Procedure Laterality Date    HAND SURGERY Right 2014    ligament repair    HAND SURGERY Right     ligament repair        Medications Prior to Admission:     Prior to Admission medications    Medication Sig Start Date End Date Taking? Authorizing Provider   testosterone cypionate (DEPOTESTOTERONE CYPIONATE) 200 MG/ML injection Inject 0.25 mLs into the skin every 7 days. 11/11/24 2/9/25  Taz Clayton MD   nitrofurantoin, macrocrystal-monohydrate, (MACROBID) 100 MG capsule Take 1 capsule by mouth 2 times daily 1/20/25 1/25/25  Taz Clayton MD        Allergies:     Latex, Haloperidol, Haloperidol lactate, and Sulfa antibiotics    Social History:     Tobacco:    reports that she quit smoking about 7 years ago. Her smoking use included cigarettes. She has never used smokeless tobacco.  Alcohol:      reports current alcohol use of about 12.0 standard drinks of alcohol per week.  Drug Use:  reports current drug use. Drug: Marijuana 
BEHAVIORAL HEALTH FOLLOW-UP NOTE     1/22/2025     Patient was seen and examined in person, Chart reviewed   Patient's case discussed with staff/team    Chief Complaint: Psychosis with suicidal Ideations    Interim History:     Patient's chart reviewed.  Per nurse patient has been more approachable and friendly this morning.  Has been participating in group activities and active in the day room while talking to other patients.  Was started on Zyprexa 5 mg.  Patient was seen and examined in her room.  She still endorses auditory hallucinations and paranoia.  States however the voices have decreased and are not currently ordering her to harm other people.  Patient did become agitated and nervous during an episode with one of the other residents however is very calm and approachable during interview.  Is currently denying suicidal ideations. All of patient's questions and concerns were answered.     /84   Pulse 81   Temp 97.5 °F (36.4 °C) (Oral)   Resp 16   Ht 1.575 m (5' 2\")   Wt 45.4 kg (100 lb)   SpO2 100%   BMI 18.29 kg/m²   Appetite:  [x] Normal/Unchanged  [] Increased  [] Decreased      Sleep:       [x] Normal/Unchanged  [] Fair       [] Poor              Energy:    [x] Normal/Unchanged  [] Increased  [] Decreased        Aggression:  [] yes  [x] no    Patient is [] able  [x] unable to CONTRACT FOR SAFETY ON THE UNIT    PAST MEDICAL/PSYCHIATRIC HISTORY:   Past Medical History:   Diagnosis Date    Anxiety     Depression        FAMILY/SOCIAL HISTORY:  Family History   Problem Relation Age of Onset    High Blood Pressure Mother     Diabetes Mother     Heart Disease Sister     Diabetes Sister      Social History     Socioeconomic History    Marital status: Single     Spouse name: Not on file    Number of children: Not on file    Years of education: Not on file    Highest education level: Not on file   Occupational History    Not on file   Tobacco Use    Smoking status: Former     Current packs/day: 0.00 
BEHAVIORAL HEALTH FOLLOW-UP NOTE     1/26/2025     Patient was seen and examined in person, Chart reviewed   Patient's case discussed with staff/team    Chief Complaint: Psychosis with suicidal Ideations     Interim History:     Patient evaluated. He has been attending group. Patient was playing cards when I went to evaluate him. We spoke in the library later. Patient appears to be doing well. Reports the auditory hallucinations has improved but continues to hear chatter. Patient also reports some passive suicidal ideations, mostly when he is alone and unengaged. Patient denies any homicidal ideations. He has been compliant with his medications otherwise. Patient requesting his dose of testosterone today. I informed the patient that it may be contributing to his psychosis but he is very adamant about getting it. He reports that he will continue getting them once he is discharged so he would like to get his dose here in case he has an adverse reaction.     /73   Pulse 99   Temp 97.6 °F (36.4 °C) (Temporal)   Resp 14   Ht 1.575 m (5' 2\")   Wt 45.4 kg (100 lb)   SpO2 99%   BMI 18.29 kg/m²   Appetite:   [x] Normal/Unchanged  [] Increased  [] Decreased      Sleep:       [x] Normal/Unchanged  [] Fair       [] Poor              Energy:    [x] Normal/Unchanged  [] Increased  [] Decreased        Aggression:  [] yes  [x] no    Patient is [x] able  [] unable to CONTRACT FOR SAFETY ON THE UNIT    PAST MEDICAL/PSYCHIATRIC HISTORY:   Past Medical History:   Diagnosis Date    Anxiety     Depression        FAMILY/SOCIAL HISTORY:  Family History   Problem Relation Age of Onset    High Blood Pressure Mother     Diabetes Mother     Heart Disease Sister     Diabetes Sister      Social History     Socioeconomic History    Marital status: Single     Spouse name: Not on file    Number of children: Not on file    Years of education: Not on file    Highest education level: Not on file   Occupational History    Not on file 
BEHAVIORAL HEALTH FOLLOW-UP NOTE     2025     Patient was seen and examined in person, Chart reviewed   Patient's case discussed with staff/team    Chief Complaint: Psychosis with suicidal Ideations    Interim History:     Patient's chart reviewed.  He is compliant with his current medications, Zyprexa increased fom 5 mg to 7.5 mg nightly.   The patient is finding his medications beneficial.  His auditory hallucinations have almost resolved.  He feels a little tired but it is not bothersome.  He has been eating better.  The patient did ask his nurse about his testosterone injection.  We will revisit this as this may be contributing to some of his psychotic symptoms.      /67   Pulse 80   Temp 97.3 °F (36.3 °C) (Oral)   Resp 14   Ht 1.575 m (5' 2\")   Wt 45.4 kg (100 lb)   SpO2 98%   BMI 18.29 kg/m²   Appetite:  [x] Normal/Unchanged  [] Increased  [] Decreased      Sleep:       [x] Normal/Unchanged  [] Fair       [] Poor              Energy:    [x] Normal/Unchanged  [] Increased  [] Decreased        Aggression:  [] yes  [x] no    Patient is [] able  [x] unable to CONTRACT FOR SAFETY ON THE UNIT    PAST MEDICAL/PSYCHIATRIC HISTORY:   Past Medical History:   Diagnosis Date    Anxiety     Depression        FAMILY/SOCIAL HISTORY:  Family History   Problem Relation Age of Onset    High Blood Pressure Mother     Diabetes Mother     Heart Disease Sister     Diabetes Sister      Social History     Socioeconomic History    Marital status: Single     Spouse name: Not on file    Number of children: Not on file    Years of education: Not on file    Highest education level: Not on file   Occupational History    Not on file   Tobacco Use    Smoking status: Former     Current packs/day: 0.00     Types: Cigarettes     Quit date: 2017     Years since quittin.3    Smokeless tobacco: Never   Vaping Use    Vaping status: Every Day   Substance and Sexual Activity    Alcohol use: Yes     Alcohol/week: 12.0 
CLINICAL PHARMACY NOTE: MEDS TO BEDS    Total # of Prescriptions Filled: 2   The following medications were delivered to the patient:  Vouchered 14 day supply due to lack of prescription insurance  Olanzapine 7.5mg  Hydroxyzine HCl 50mg    Additional Documentation: 1/27/25 12:40pm barbara delivered to [facility]  
Pharmacy Medication History Note      List of current medications patient is taking is complete.    Source of information: [facility] (Abi at [phone]), Epic, PDMP, [employer] dispense report    Changes made to medication list:  Medications removed (include reason, ex. therapy complete or physician discontinued, noncompliance):  Propranolol (list clean up), Trazodone (list clean up), Fluoxetine (list clean up), Bupropion (list clean up), Vitamin D (list clean up)    Medications flagged for provider review:  none    Medications added/doses adjusted:  Added Testosterone 200 mg/mL, inject 50 mg (0.25mL) weekly - LF 12/24/24  Adjusted Nitrofurantoin to 100 mg twice daily for 5 days    Other notes (ex. Recent course of antibiotics, Coumadin dosing):  Patient was prescribed Nitrofurantoin at [facility] prior to transfer to [facility] for possible UTI.      Current Home Medication List at Time of Admission:  Prior to Admission medications    Medication Sig   testosterone cypionate (DEPOTESTOTERONE CYPIONATE) 200 MG/ML injection Inject 0.25 mLs into the skin every 7 days.   nitrofurantoin, macrocrystal-monohydrate, (MACROBID) 100 MG capsule Take 1 capsule by mouth 2 times daily         Please let me know if you have any questions about this encounter. Thank you!    Electronically signed by Kraig Guerrero RPH on 1/21/2025 at 9:38 AM     
RT ASSESSMENT TREATMENT GOALS    [x]Pt Goal:  Pt will identify 1-2 positive coping skills by time of discharge.    []Pt Goal:  Pt will identify 1-2 positive aspects of self by time of discharge.    []Pt Goal:  Pt will remain on task/topic for 15-30 minutes during group by time of discharge.    []Pt Goal:  Pt will identify 1-2 aspects of relapse prevention plan by time of discharge.    []Pt Goal:  Pt will join in conversation with peers 1-2 times per group by time of discharge.    [x]Pt Goal:  Pt will identify 1-2 new leisure interests by time of discharge.    [x]Pt Goal:  Pt will not voice any delusional content by time of discharge.    
(Weed).    Family History:     Family History   Problem Relation Age of Onset    High Blood Pressure Mother     Diabetes Mother     Heart Disease Sister     Diabetes Sister        Review of Systems:     Positive and Negative as described in HPI.    CONSTITUTIONAL:  negative for fevers, chills, sweats, fatigue, weight loss  HEENT:  negative for vision, hearing changes, runny nose, throat pain  RESPIRATORY:  negative for shortness of breath, cough, congestion, wheezing.  CARDIOVASCULAR:  negative for chest pain, palpitations.  GASTROINTESTINAL:  negative for nausea, vomiting, diarrhea, constipation, change in bowel habits, abdominal pain   GENITOURINARY:  negative for difficulty of urination, burning with urination, frequency   INTEGUMENT:  negative for rash, skin lesions, easy bruising   HEMATOLOGIC/LYMPHATIC:  negative for swelling/edema   ALLERGIC/IMMUNOLOGIC:  negative for urticaria , itching  ENDOCRINE:  negative increase in drinking, increase in urination, hot or cold intolerance  MUSCULOSKELETAL:  negative joint pains, muscle aches, swelling of joints  NEUROLOGICAL:  negative for headaches, dizziness, lightheadedness, numbness, pain, tingling extremities      Physical Exam:     BP 94/66   Pulse 73   Temp 96.9 °F (36.1 °C) (Temporal)   Resp 14   Ht 1.575 m (5' 2\")   Wt 45.4 kg (100 lb)   SpO2 99%   BMI 18.29 kg/m²   Temp (24hrs), Av.7 °F (36.5 °C), Min:96.9 °F (36.1 °C), Max:98.4 °F (36.9 °C)    No results for input(s): \"POCGLU\" in the last 72 hours.  No intake or output data in the 24 hours ending 25 1427    General Appearance:  alert, well appearing, and in no acute distress  Mental status: oriented to person, place, and time   Head:  normocephalic, atraumatic.  Neck: supple, no carotid bruits, thyroid not palpable  Lungs: Bilateral equal air entry, clear to ausculation, no wheezing, rales or rhonchi, normal effort  Cardiovascular: normal rate, regular rhythm, no murmur, gallop, rub.  Abdomen: 
5 mg, 5 mg, Oral, Nightly, Aftab Bird MD, 5 mg at 01/22/25 2122    acetaminophen (TYLENOL) tablet 650 mg, 650 mg, Oral, Q4H PRN, Mary Chatterjee APRN - CNP    ibuprofen (ADVIL;MOTRIN) tablet 400 mg, 400 mg, Oral, Q6H PRN, Mary Chatterjee APRN - CNP    polyethylene glycol (GLYCOLAX) packet 17 g, 17 g, Oral, Daily PRN, Mary Chatterjee APRN - CNP    aluminum & magnesium hydroxide-simethicone (MAALOX) 200-200-20 MG/5ML suspension 30 mL, 30 mL, Oral, Q6H PRN, Mary Chatterjee APRN - CNP    hydrOXYzine HCl (ATARAX) tablet 50 mg, 50 mg, Oral, TID PRN, Mary Chatterjee APRN - CNP, 50 mg at 01/22/25 2122    melatonin tablet 3 mg, 3 mg, Oral, Nightly, Mary Chatterjee APRN - CNP, 3 mg at 01/22/25 2122    nicotine polacrilex (COMMIT) lozenge 2 mg, 2 mg, Oral, Q1H PRN, Mary Chatterjee APRN - CNP, 2 mg at 01/23/25 1053    nitrofurantoin (macrocrystal-monohydrate) (MACROBID) capsule 100 mg, 100 mg, Oral, 2 times per day, Mary Chatterjee APRN - CNP, 100 mg at 01/23/25 0850      Examination:  BP 92/62   Pulse 69   Temp 97.5 °F (36.4 °C) (Temporal)   Resp 16   Ht 1.575 m (5' 2\")   Wt 45.4 kg (100 lb)   SpO2 98%   BMI 18.29 kg/m²   Gait - steady  Medication side effects(SE):     Mental Status Examination:    Level of consciousness:  within normal limits   Appearance:  good grooming and good hygiene  Behavior/Motor:  no abnormalities noted  Attitude toward examiner:  cooperative, attentive, and good eye contact  Speech:  spontaneous, normal rate, normal volume, and well articulated   Mood: anxious and decreased range  Affect:  flat and anxious  Thought processes:  linear, goal directed, and coherent   Thought content:  Homicidal ideation - none  Suicidal Ideation:  denies suicidal ideation  Delusions:  paranoid  Perceptual Disturbance:  auditory  Cognition:  oriented to person, place, and time   Concentration intact  Insight fair   Judgement fair     ASSESSMENT:   Patient symptoms are:  [] Well 
sensory deficits, moving all extremities spontaneously.   Skin: No gross lesions, rashes, bruising or bleeding on exposed skin area  Extremities:  peripheral pulses palpable, no pedal edema or calf pain with palpation    Investigations:      Laboratory Testing:  No results found for this or any previous visit (from the past 24 hour(s)).    Imaging/Diagonstics:  No results found.    Assessment :      Hospital Problems             Last Modified POA    * (Principal) Acute psychosis (HCC) 1/21/2025 Yes    Anxiety and depression 1/21/2025 Yes    Marijuana use, episodic 1/21/2025 Yes    Low vitamin D level 1/21/2025 Yes    Renal stone 1/21/2025 Yes    Smoker 1/21/2025 Yes    Recurrent UTI 1/21/2025 Yes       Plan:     Admitted to inpatient psych with suicidal ideations,  Anxiety and depression, getting medications as per psych,  Marijuana abuse, advised to quit,  Current smoker advised to quit,  UTI, on nitrofurantoin at this time, low vitamin D levels, replaced,  CT of the abdomen pelvis showed 2 renal stones nonobstructive, continue to monitor, hydrate orally  Labs and medications reviewed.     DVT prophylaxis,  Pt mobile   Full code status       Consultations:   IP CONSULT TO INTERNAL MEDICINE      Jay Alicea MD  1/26/2025  1:59 PM    Copy sent to Dr. Thorne primary care provider on file.    Please note that this chart was generated using voice recognition Dragon dictation software.  Although every effort was made to ensure the accuracy of this automated transcription, some errors in transcription may have occurred.    
controlled  [x] Improving  [] Worsening  [] No change      Diagnosis:   Principal Problem:    Acute psychosis (HCC)  Active Problems:    Anxiety and depression    Marijuana use, episodic    Low vitamin D level    Renal stone    Smoker    Recurrent UTI  Resolved Problems:    * No resolved hospital problems. *      LABS:    No results for input(s): \"WBC\", \"HGB\", \"PLT\" in the last 72 hours.  No results for input(s): \"NA\", \"K\", \"CL\", \"CO2\", \"BUN\", \"CREATININE\", \"GLUCOSE\" in the last 72 hours.  No results for input(s): \"BILITOT\", \"ALKPHOS\", \"AST\", \"ALT\" in the last 72 hours.  No results found for: \"LABAMPH\", \"BARBSCNU\", \"LABBENZ\", \"CANNAB\", \"LABMETH\", \"PPXUR\", \"ETOH\"  Lab Results   Component Value Date/Time    TSH 1.33 09/07/2017 11:15 AM     No results found for: \"LITHIUM\"  No results found for: \"VALPROATE\", \"CBMZ\"    RISK ASSESSMENT: High due to suicidal and homicidal ideations    Treatment Plan:    Zyprexa increased to 7.5 mg nightly.   Atarax and Thorazine as needed     Reviewed current Medications with the patient.   No Medication Changes Today    Risks, benefits, side effects, drug-to-drug interactions and alternatives to treatment were discussed. The patient has consented to treatment.     Encourage patient to attend group and other milieu activities.  Discharge planning discussed with the patient and treatment team.    PSYCHOTHERAPY/COUNSELING:  [] Therapeutic interview  [x] Supportive  [] CBT  [] Ongoing  [] Other    [x] Patient continues to need, on a daily basis, active treatment furnished directly by or requiring the supervision of inpatient psychiatric personnel      Anticipated Length of stay:5-7 days                                      Tasneem Martínez is a 29 y.o. female being evaluated face to face.     --Larisa Maya MD on 1/24/2025 at 9:44 AM    An electronic signature was used to authenticate this note.     **This report has been created using voice recognition software. It may contain minor errors
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
## 2025-01-27 NOTE — GROUP NOTE
Group Therapy Note    Date: 1/27/2025    Group Start Time: 1100  Group End Time: 1145  Group Topic: Cognitive Skills    [facility] Kristen Price CTRS        Group Therapy Note    Attendees: 9/23     Patient's Goal: To increase socialization, practice decision making skills and communication skills.              Notes: Pt was pleasant and cooperative and participated in 11:00 Cognitive Skills Group. Pt was able   to practice decision making skills and communication skills independently.          Status After Intervention:  Improved     Participation Level:  Attentive, sharing , supportive     Participation Quality: Attentive, appropriate, sharing , supportive        Speech:  Normal        Thought Process/Content: Logical, linear        Affective Functioning: Congruent, brightened        Mood: Euthymic        Level of consciousness:  Attentive and Alert        Response to Learning: Able to verbalize current knowledge/experience, Able to verbalize/acknowledge   new learning, and Progressing to goal        Endings: None Reported     Modes of Intervention: Education, Support, Exploration, Clarifying, and Problem solving        Discipline Responsible: Psychoeducational Specialist        Signature:  MIRZA ROBERTS

## 2025-01-27 NOTE — TRANSITION OF CARE
Behavioral Health Transition Record    Patient Name: Tasneem Martínez  YOB: 1995   Medical Record Number: [id number]  Date of Admission: 1/20/2025 11:29 PM   Date of Discharge: 1/27/2025    Attending Provider: Aftab Bird MD   Discharging Provider: Marge  To contact this individual call [phone] and ask the  to page.  If unavailable, ask to be transferred to Behavioral Health Provider on call.  A Behavioral Health Provider will be available on call 24/7 and during holidays.    Primary Care Provider: No primary care provider on file.    Allergies   Allergen Reactions    Latex     Haloperidol Shortness Of Breath    Haloperidol Lactate Shortness Of Breath    Sulfa Antibiotics        Reason for Admission: Patient presents to the ED with suicidial thoughts to overdose on medications. Patient reports increased auditory hallucinations of multiple different voices having conversations. He also reports increased \"PTSD attacks\" cause inability to sleep     Admission Diagnosis: Depression with suicidal ideation [F32.A, R45.851]    * No surgery found *    No results found for this visit on 01/20/25.    Immunizations administered during this encounter:   There is no immunization history on file for this patient.  Influenza Vaccination Status: Documentation of patient's or caregiver's refusal of influenza vaccine.    Screening for Metabolic Disorders for Patients on Antipsychotic Medications  (Data obtained from the EMR)    Estimated Body Mass Index  Body mass index is 18.29 kg/m².      Vital Signs/Blood Pressure  BP 94/65   Pulse 71   Temp 97.7 °F (36.5 °C) (Oral)   Resp 14   Ht 1.575 m (5' 2\")   Wt 45.4 kg (100 lb)   SpO2 100%   BMI 18.29 kg/m²      Fasting Blood Glucose or Hemoglobin A1c  No results found for: \"GLU\", \"GLUCPOC\"    Hemoglobin A1C   Date Value Ref Range Status   09/07/2017 4.6 4.0 - 6.0 % Final       Discharge Diagnosis: Acute psychosis     Discharge Plan/Destination:

## 2025-01-27 NOTE — GROUP NOTE
Group Therapy Note    Date: 1/27/2025    Group Start Time: 1000  Group End Time: 1040  Group Topic: Psychotherapy    [facility]    Catherine Holbrook MSW, LSW        Group Therapy Note    Attendees: 8/23       Patient's Goal:  Increase interpersonal relationship skills utilizing talk therapy while discussing positive coping skills for depression.    Status After Intervention:  Improved    Participation Level: Active Listener and Interactive    Participation Quality: Appropriate, Attentive, and Sharing      Speech:  normal      Thought Process/Content: Logical      Affective Functioning: Congruent      Mood: euthymic      Level of consciousness:  Alert and Attentive      Response to Learning: Able to verbalize current knowledge/experience, Able to verbalize/acknowledge new learning, and Able to retain information      Endings: None Reported    Modes of Intervention: Education, Support, and Socialization      Discipline Responsible: /Counselor      Signature:  NATALIIA Awad LSW

## 2025-01-27 NOTE — CARE COORDINATION
Discharge Arrangements:  Return to apartment in [place], Continue with [facility] closer to apartment before moving to [place], [place]    Guardian notified: N/A    Discharge destination/address: Home - address on facesheet    Transported by:  Cab    Follow up appointment is scheduled for [facility] [place], [place]. [address] [facility] [address], THURS, Jan 20th at 11AM  and FRI, Jan 31st at 2:30PM     Patient was not accepting of referral. No current or documented history of BRY.  *BRY resources were offered to patient throughout admission and at time of discharge. This list of [facility] BRY providers was provided to patient:     [facility] [place] [facility]  [address] [facility] [address]   [address] [facility] [address]  Phone: [phone]    Phone: [phone]    [facility] [place] [facility] [place]   [address] [facility] [address]  [address] [facility] [address]  Phone: [phone]    Phone: [phone]    [facility] [place]  [address] [facility] [address]    [address] [facility] [address]  Phone: [phone]    Phone: [phone]    [facility]     [facility]   [address] [facility] [address]  [place] [address]      Phone: [phone]  Phone: [phone]        [facility]  [address] [facility] [address]  [address] [facility] [address]  Phone: [phone]    Phone:  [phone]    [employer]     [facility], [facility]  [address] [facility] [address]  [address] [facility] [address]  Phone: [phone]    Phone: [phone]    [facility], [facility]  [address] [facility] [address]  [address] [facility] [address]  Phone: [phone]    Phone: [phone]    [facility]  [facility]  [address] [facility] [address] [facility] [address] [address] [facility] [address] [place]

## 2025-01-27 NOTE — BH NOTE
Patient given tobacco quitline number 70659037167 at this time, refusing to call at this time and states they are not interested in quitting.  Will consider options in the future.  Patient given information as to the dangers of long term tobacco use. Continue to reinforce the importance of tobacco cessation.

## 2025-01-27 NOTE — DISCHARGE INSTRUCTIONS
Information:  Medications:   Medication summary provided   I understand that I should take only the medications on my list.     -why and when I need to take each medicine.     -which side effects to watch for.     -that I should carry my medication information at all times in case of     Emergency situations.    I will take all of my medicines to follow up appointments.     -check with my physician or pharmacist before taking any new    Medication, over the counter product or drink alcohol.    -Ask about food, drug or dietary supplement interactions.    -discard old lists and update records with medication providers.    Notify Physician:  Notify physician if you notice:   Always call 911 if you feel your life is in danger  In case of an emergency call 911 immediately!  If 911 is not available call your local emergency medical system for help    Behavioral Health Follow Up:  Original Referral Source:[facility]  Discharge Diagnosis: Depression with suicidal ideation [F32.A, R45.851]  Recommendations for Level of Care: Take medications as prescribed. Keep all follow up appointments   Patient status at discharge: Stable. Alert and oriented   My hospital  was: [facility]  Aftercare plan faxed: [facility]   -faxed by: Staff    -date: 1/27/2025   -time: 1200  Prescriptions: Sent to [facility]     Smoking: Quit Smoking.   Call the [facility] smoking quitline at 5-120-05C-QUIT  Know the signs of a heart attack   If you have any of the following symptoms call 911 immediately, do not wait more    Than five minutes.    1. Pressure, fullness and/ or squeezing in the center of the chest spreading to    The jaw, neck or shoulder.    2. Chest discomfort with light headedness, fainting, sweating, nausea or    Shortness of breath.   3. Upper abdominal pressure or discomfort.   4. Lower chest pain, back pain, unusual fatigue, shortness of breath, nausea   Or dizziness.     General Information:   Questions regarding your bill: Call HELP